# Patient Record
Sex: MALE | Race: WHITE | Employment: UNEMPLOYED | URBAN - METROPOLITAN AREA
[De-identification: names, ages, dates, MRNs, and addresses within clinical notes are randomized per-mention and may not be internally consistent; named-entity substitution may affect disease eponyms.]

---
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
## 2021-06-29 ENCOUNTER — OFFICE VISIT (OUTPATIENT)
Dept: FAMILY MEDICINE CLINIC | Age: 21
End: 2021-06-29
Payer: COMMERCIAL

## 2021-06-29 VITALS
OXYGEN SATURATION: 96 % | HEIGHT: 71 IN | WEIGHT: 138.6 LBS | DIASTOLIC BLOOD PRESSURE: 60 MMHG | SYSTOLIC BLOOD PRESSURE: 110 MMHG | BODY MASS INDEX: 19.4 KG/M2 | HEART RATE: 130 BPM

## 2021-06-29 DIAGNOSIS — J03.90 TONSILLITIS: ICD-10-CM

## 2021-06-29 PROBLEM — F34.1 DYSTHYMIA: Status: RESOLVED | Noted: 2019-03-14 | Resolved: 2021-06-29
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
## 2021-06-29 PROCEDURE — G8420 CALC BMI NORM PARAMETERS: HCPCS | Performed by: FAMILY MEDICINE

## 2021-06-29 PROCEDURE — 99213 OFFICE O/P EST LOW 20 MIN: CPT | Performed by: FAMILY MEDICINE

## 2021-06-29 PROCEDURE — G8427 DOCREV CUR MEDS BY ELIG CLIN: HCPCS | Performed by: FAMILY MEDICINE

## 2021-06-29 PROCEDURE — 1036F TOBACCO NON-USER: CPT | Performed by: FAMILY MEDICINE

## 2021-06-29 RX ORDER — CEPHALEXIN 500 MG/1
500 CAPSULE ORAL 3 TIMES DAILY
Qty: 21 CAPSULE | Refills: 0 | Status: SHIPPED | OUTPATIENT
Start: 2021-06-29 | End: 2021-07-06

## 2021-06-29 SDOH — ECONOMIC STABILITY: FOOD INSECURITY: WITHIN THE PAST 12 MONTHS, THE FOOD YOU BOUGHT JUST DIDN'T LAST AND YOU DIDN'T HAVE MONEY TO GET MORE.: NEVER TRUE

## 2021-06-29 SDOH — ECONOMIC STABILITY: FOOD INSECURITY: WITHIN THE PAST 12 MONTHS, YOU WORRIED THAT YOUR FOOD WOULD RUN OUT BEFORE YOU GOT MONEY TO BUY MORE.: NEVER TRUE

## 2021-06-29 ASSESSMENT — PATIENT HEALTH QUESTIONNAIRE - PHQ9
SUM OF ALL RESPONSES TO PHQ9 QUESTIONS 1 & 2: 0
SUM OF ALL RESPONSES TO PHQ QUESTIONS 1-9: 0
2. FEELING DOWN, DEPRESSED OR HOPELESS: 0
SUM OF ALL RESPONSES TO PHQ QUESTIONS 1-9: 0
SUM OF ALL RESPONSES TO PHQ QUESTIONS 1-9: 0
1. LITTLE INTEREST OR PLEASURE IN DOING THINGS: 0

## 2021-06-29 ASSESSMENT — SOCIAL DETERMINANTS OF HEALTH (SDOH): HOW HARD IS IT FOR YOU TO PAY FOR THE VERY BASICS LIKE FOOD, HOUSING, MEDICAL CARE, AND HEATING?: NOT HARD AT ALL

## 2021-06-29 ASSESSMENT — ENCOUNTER SYMPTOMS
CHANGE IN BOWEL HABIT: 0
VISUAL CHANGE: 0
NAUSEA: 0
SWOLLEN GLANDS: 0
ABDOMINAL PAIN: 0

## 2021-06-29 NOTE — PROGRESS NOTES
Subjective:     Patient ID:Flaco De La Cruz is a 21 y.o. male. Pharyngitis  This is a recurrent problem. The current episode started in the past 7 days. The problem has been waxing and waning. Pertinent negatives include no abdominal pain, arthralgias, change in bowel habit, chest pain, chills, diaphoresis, headaches, joint swelling, myalgias, nausea, neck pain, numbness, swollen glands, urinary symptoms, vertigo, visual change or weakness. Nothing aggravates the symptoms. He has tried nothing for the symptoms. The treatment provided moderate relief. --recurrent--has had tonsil stones  Allergies   Allergen Reactions    Decadron [Dexamethasone]      Intol-hives       Current Outpatient Medications   Medication Sig Dispense Refill    cephALEXin (KEFLEX) 500 MG capsule Take 1 capsule by mouth 3 times daily for 7 days 21 capsule 0     No current facility-administered medications for this visit. Past Medical History:   Diagnosis Date    Varicella 2005       No past surgical history on file. Social History     Socioeconomic History    Marital status: Single     Spouse name: Not on file    Number of children: Not on file    Years of education: Not on file    Highest education level: Not on file   Occupational History    Not on file   Tobacco Use    Smoking status: Never Smoker    Smokeless tobacco: Never Used    Tobacco comment: no second hand smoke exposure   Substance and Sexual Activity    Alcohol use: No    Drug use: No    Sexual activity: Not on file   Other Topics Concern    Not on file   Social History Narrative    Not on file     Social Determinants of Health     Financial Resource Strain: Low Risk     Difficulty of Paying Living Expenses: Not hard at all   Food Insecurity: No Food Insecurity    Worried About [address] in the Last Year: Never true    [address] in the Last Year: Never true   Transportation Needs:     Lack of Transportation (Medical):      Lack of Lymphadenopathy:      Cervical: No cervical adenopathy. Assessment and Plan:      Tonsillitis  Local care and will tx with keflex

## 2021-07-10 ENCOUNTER — NURSE TRIAGE (OUTPATIENT)
Dept: OTHER | Facility: CLINIC | Age: 21
End: 2021-07-10

## 2021-07-10 NOTE — TELEPHONE ENCOUNTER
Reason for Disposition   [1] Sore throat with cough/cold symptoms AND [2] present > 5 days    Answer Assessment - Initial Assessment Questions  1. ONSET: \"When did the throat start hurting? \" (Hours or days ago)   6/28/2021, was seen and given keflex on 6/29/2021. Finished antibiotics, which helped but once finished felt bad again a few days later    2. SEVERITY: \"How bad is the sore throat? \" (Scale 1-10; mild, moderate or severe)    - MILD (1-3):  doesn't interfere with eating or normal activities    - MODERATE (4-7): interferes with eating some solids and normal activities    - SEVERE (8-10):  excruciating pain, interferes with most normal activities    - SEVERE DYSPHAGIA: can't swallow liquids, drooling    7/10    3. STREP EXPOSURE: \"Has there been any exposure to strep within the past week? \" If so, ask: \"What type of contact occurred? \"   No    4. VIRAL SYMPTOMS: Lyndal Fly there any symptoms of a cold, such as a runny nose, cough, hoarse voice or red eyes? \"   Cough-saw a little red in mucous a few times, runny nose    5. FEVER: \"Do you have a fever? \" If so, ask: \"What is your temperature, how was it measured, and when did it start? \"   no, hasn't checked and doesn't feel like he has a fever    6. PUS ON THE TONSILS: \"Is there pus on the tonsils in the back of your throat? \"  Has tonsil stones and slimy stuff on tonsils    7. OTHER SYMPTOMS: \"Do you have any other symptoms? \" (e.g., difficulty breathing, headache, rash)  Headache    8. PREGNANCY: \"Is there any chance you are pregnant? \" \"When was your last menstrual period? \"  n/a    Protocols used: SORE THROAT-ADULT-  Received call from [address] at [facility] with Red Flag Complaint. Brief description of triage: sore throat after finishing antibiotics. Triage indicates for patient to be seen within 3 days.     Care advice provided, patient verbalizes understanding; denies any other questions or concerns; instructed to call back for any new or worsening symptoms. Writer provided warm transfer to [facility] for appointment scheduling. Attention Provider: Thank you for allowing me to participate in the care of your patient. The patient was connected to triage in response to information provided to the [facility]. Please do not respond through this encounter as the response is not directed to a shared pool.

## 2021-07-11 RX ORDER — AMOXICILLIN 875 MG/1
875 TABLET, COATED ORAL 2 TIMES DAILY
Qty: 20 TABLET | Refills: 0 | Status: SHIPPED | OUTPATIENT
Start: 2021-07-11 | End: 2021-07-21

## 2021-07-12 ENCOUNTER — OFFICE VISIT (OUTPATIENT)
Dept: FAMILY MEDICINE CLINIC | Age: 21
End: 2021-07-12
Payer: COMMERCIAL

## 2021-07-12 VITALS
DIASTOLIC BLOOD PRESSURE: 64 MMHG | SYSTOLIC BLOOD PRESSURE: 126 MMHG | TEMPERATURE: 97.7 F | HEART RATE: 90 BPM | OXYGEN SATURATION: 97 %

## 2021-07-12 DIAGNOSIS — Z87.898 HISTORY OF SNORING: ICD-10-CM

## 2021-07-12 DIAGNOSIS — J35.1 CHRONIC TONSILLAR HYPERTROPHY: Primary | ICD-10-CM

## 2021-07-12 DIAGNOSIS — J35.1 CHRONIC TONSILLAR HYPERTROPHY: ICD-10-CM

## 2021-07-12 DIAGNOSIS — J35.8 TONSILLITH: ICD-10-CM

## 2021-07-12 LAB
BASOPHILS ABSOLUTE: 0 K/UL (ref 0–0.2)
BASOPHILS RELATIVE PERCENT: 0.3 %
EOSINOPHILS ABSOLUTE: 0.1 K/UL (ref 0–0.6)
EOSINOPHILS RELATIVE PERCENT: 1.2 %
HCT VFR BLD CALC: 37.7 % (ref 40.5–52.5)
HEMOGLOBIN: 12.8 G/DL (ref 13.5–17.5)
LYMPHOCYTES ABSOLUTE: 2.2 K/UL (ref 1–5.1)
LYMPHOCYTES RELATIVE PERCENT: 17.5 %
MCH RBC QN AUTO: 30.4 PG (ref 26–34)
MCHC RBC AUTO-ENTMCNC: 34 G/DL (ref 31–36)
MCV RBC AUTO: 89.6 FL (ref 80–100)
MONOCYTES ABSOLUTE: 0.9 K/UL (ref 0–1.3)
MONOCYTES RELATIVE PERCENT: 6.9 %
NEUTROPHILS ABSOLUTE: 9.4 K/UL (ref 1.7–7.7)
NEUTROPHILS RELATIVE PERCENT: 74.1 %
PDW BLD-RTO: 13.7 % (ref 12.4–15.4)
PLATELET # BLD: 278 K/UL (ref 135–450)
PMV BLD AUTO: 9.7 FL (ref 5–10.5)
RBC # BLD: 4.2 M/UL (ref 4.2–5.9)
WBC # BLD: 12.7 K/UL (ref 4–11)

## 2021-07-12 PROCEDURE — G8427 DOCREV CUR MEDS BY ELIG CLIN: HCPCS | Performed by: NURSE PRACTITIONER

## 2021-07-12 PROCEDURE — G8420 CALC BMI NORM PARAMETERS: HCPCS | Performed by: NURSE PRACTITIONER

## 2021-07-12 PROCEDURE — 99213 OFFICE O/P EST LOW 20 MIN: CPT | Performed by: NURSE PRACTITIONER

## 2021-07-12 PROCEDURE — 1036F TOBACCO NON-USER: CPT | Performed by: NURSE PRACTITIONER

## 2021-07-12 ASSESSMENT — PATIENT HEALTH QUESTIONNAIRE - PHQ9
SUM OF ALL RESPONSES TO PHQ QUESTIONS 1-9: 0
SUM OF ALL RESPONSES TO PHQ9 QUESTIONS 1 & 2: 0
SUM OF ALL RESPONSES TO PHQ QUESTIONS 1-9: 0
2. FEELING DOWN, DEPRESSED OR HOPELESS: 0
1. LITTLE INTEREST OR PLEASURE IN DOING THINGS: 0
SUM OF ALL RESPONSES TO PHQ QUESTIONS 1-9: 0

## 2021-07-12 NOTE — PROGRESS NOTES
Ludwin Berg (:  2000) is a 21 y.o. male,Established patient, here for evaluation of the following chief complaint(s): Other (sore throat, tonsil stones x 2weeks )      ASSESSMENT/PLAN:  1. Chronic tonsillar hypertrophy  -     Shayy Sevilla DO, Otolaryngology, [place]  -     CBC WITH AUTO DIFFERENTIAL; Future  -     COMPREHENSIVE METABOLIC PANEL; Future  -     MONONUCLEOSIS SCREEN; Future  2. History of snoring  3. Tonsillith  Recurrent tonsillitis. Will rule out mono. We are not strep swabbing in this office. Centor criteria 1.  mother is refusing to have him Covid tested. I did educate her that it is possible the ENT specialist will not be able to provide full care for him until he has a negative Covid test and she is aware of this. No follow-ups on file. SUBJECTIVE/OBJECTIVE:  Angie Greenwood is a 80-year-old who presents with 2 weeks of recurrent sore throat. Reports many years of recurrent tonsil stones, recurrent tonsillitis, recurrent strep. 2 weeks ago was treated with a 10-day course of Keflex for tonsillitis and states he had good relief however when he stopped the antibiotic the pain came back, and the tonsil stones returned. 2 days ago was restarted on amoxicillin reports some improvement. He does pick these tonsil stones out regularly. He presents today with complaints of swollen tonsils. He denies any fever, cough, shortness of breath, fatigue, abdominal pain. Denies difficulty swallowing at this time. His mother reports he does snore. Current Outpatient Medications   Medication Sig Dispense Refill    amoxicillin (AMOXIL) 875 MG tablet Take 1 tablet by mouth 2 times daily for 10 days 20 tablet 0     No current facility-administered medications for this visit. Review of Systems   All other systems reviewed and are negative.       Vitals:    21 1459   BP: 126/64   Pulse: 90   Temp: 97.7 °F (36.5 °C)   SpO2: 97%       Physical Exam  Constitutional: Appearance: He is well-developed. HENT:      Head: Normocephalic. Right Ear: Tympanic membrane normal.      Left Ear: Tympanic membrane normal.      Mouth/Throat:      Mouth: Mucous membranes are moist.      Pharynx: Pharyngeal swelling and posterior oropharyngeal erythema present. Tonsils: No tonsillar exudate or tonsillar abscesses. 3+ on the right. 3+ on the left. Eyes:      Pupils: Pupils are equal, round, and reactive to light. Cardiovascular:      Rate and Rhythm: Normal rate and regular rhythm. Heart sounds: Normal heart sounds. Pulmonary:      Effort: Pulmonary effort is normal.      Breath sounds: Normal breath sounds. Musculoskeletal:         General: Normal range of motion. Cervical back: Normal range of motion. Skin:     General: Skin is warm and dry. Coloration: Skin is pale. Neurological:      Mental Status: He is alert and oriented to person, place, and time. Psychiatric:         Mood and Affect: Mood normal.                 An electronic signature was used to authenticate this note.     --KIERAN Adkins - CNP

## 2021-07-13 LAB
A/G RATIO: 1.3 (ref 1.1–2.2)
ALBUMIN SERPL-MCNC: 4.3 G/DL (ref 3.4–5)
ALP BLD-CCNC: 106 U/L (ref 40–129)
ALT SERPL-CCNC: 20 U/L (ref 10–40)
ANION GAP SERPL CALCULATED.3IONS-SCNC: 12 MMOL/L (ref 3–16)
AST SERPL-CCNC: 20 U/L (ref 15–37)
BILIRUB SERPL-MCNC: 0.3 MG/DL (ref 0–1)
BUN BLDV-MCNC: 8 MG/DL (ref 7–20)
CALCIUM SERPL-MCNC: 9.7 MG/DL (ref 8.3–10.6)
CHLORIDE BLD-SCNC: 101 MMOL/L (ref 99–110)
CO2: 29 MMOL/L (ref 21–32)
CREAT SERPL-MCNC: 0.8 MG/DL (ref 0.9–1.3)
GFR AFRICAN AMERICAN: >60
GFR NON-AFRICAN AMERICAN: >60
GLOBULIN: 3.3 G/DL
GLUCOSE BLD-MCNC: 68 MG/DL (ref 70–99)
MONO TEST: NEGATIVE
POTASSIUM SERPL-SCNC: 4.2 MMOL/L (ref 3.5–5.1)
SODIUM BLD-SCNC: 142 MMOL/L (ref 136–145)
TOTAL PROTEIN: 7.6 G/DL (ref 6.4–8.2)

## 2021-07-20 ENCOUNTER — OFFICE VISIT (OUTPATIENT)
Dept: ENT CLINIC | Age: 21
End: 2021-07-20
Payer: COMMERCIAL

## 2021-07-20 VITALS
SYSTOLIC BLOOD PRESSURE: 122 MMHG | BODY MASS INDEX: 18.48 KG/M2 | HEIGHT: 71 IN | TEMPERATURE: 99 F | WEIGHT: 132 LBS | DIASTOLIC BLOOD PRESSURE: 73 MMHG | HEART RATE: 94 BPM

## 2021-07-20 DIAGNOSIS — J35.01 CHRONIC TONSILLITIS: Primary | ICD-10-CM

## 2021-07-20 DIAGNOSIS — J35.1 TONSILLAR HYPERTROPHY: ICD-10-CM

## 2021-07-20 PROCEDURE — 1036F TOBACCO NON-USER: CPT | Performed by: OTOLARYNGOLOGY

## 2021-07-20 PROCEDURE — G8419 CALC BMI OUT NRM PARAM NOF/U: HCPCS | Performed by: OTOLARYNGOLOGY

## 2021-07-20 PROCEDURE — G8427 DOCREV CUR MEDS BY ELIG CLIN: HCPCS | Performed by: OTOLARYNGOLOGY

## 2021-07-20 PROCEDURE — 99204 OFFICE O/P NEW MOD 45 MIN: CPT | Performed by: OTOLARYNGOLOGY

## 2021-07-20 ASSESSMENT — ENCOUNTER SYMPTOMS
FACIAL SWELLING: 0
VOMITING: 0
BLOOD IN STOOL: 0
COLOR CHANGE: 0
WHEEZING: 0
STRIDOR: 0
COUGH: 0
SINUS PRESSURE: 0
CHOKING: 0
RHINORRHEA: 0
SORE THROAT: 1
VOICE CHANGE: 0
EYE ITCHING: 0
BACK PAIN: 0
SINUS PAIN: 0
CONSTIPATION: 0
DIARRHEA: 0
TROUBLE SWALLOWING: 0
PHOTOPHOBIA: 0
NAUSEA: 0
SHORTNESS OF BREATH: 0
EYE DISCHARGE: 0

## 2021-07-20 NOTE — PROGRESS NOTES
[facility]  [address], [address] [place] [address]  P: [phone]  F: [phone]       Patient     Jesús Wilder  2000    ChiefComplaint     Chief Complaint   Patient presents with    New Patient     Patient is here today because he has had tonsils stones every month this year, he is looking to have his tonsil removed       History of Present Illness     Jesús Wilder is a pleasant 21 y.o. male who presents as a new patient today referred by his primary care physician for chronic tonsillitis and tonsillar hypertrophy. Patient has had significantly enlarged tonsils to his entire life. He has not had any significant episodes with infections up until recently. Over the past 6 months he has had multiple infections requiring antibiotics. Additionally, he has constant production and expression of tonsil stones. He has a constant low-grade sore throat. He is also experiencing bad breath from this. Denies any dysphagia or history of peritonsillar abscess. Denies difficulty breathing. He does snore at night. The patient is interested in tonsillectomy. Past Medical History     Past Medical History:   Diagnosis Date    Varicella 2005       Past Surgical History     No past surgical history on file. Family History     No family history on file.     Social History     Social History     Socioeconomic History    Marital status: Single     Spouse name: Not on file    Number of children: Not on file    Years of education: Not on file    Highest education level: Not on file   Occupational History    Not on file   Tobacco Use    Smoking status: Never Smoker    Smokeless tobacco: Never Used    Tobacco comment: no second hand smoke exposure   Substance and Sexual Activity    Alcohol use: No    Drug use: No    Sexual activity: Not on file   Other Topics Concern    Not on file   Social History Narrative    Not on file     Social Determinants of Health     Financial Resource Strain: Low Risk     Difficulty of Paying Living Expenses: Not hard at all   Food Insecurity: No Food Insecurity    Worried About Running Out of Food in the Last Year: Never true    Doug of Food in the Last Year: Never true   Transportation Needs:     Lack of Transportation (Medical):  Lack of Transportation (Non-Medical):    Physical Activity:     Days of Exercise per Week:     Minutes of Exercise per Session:    Stress:     Feeling of Stress :    Social Connections:     Frequency of Communication with Friends and Family:     Frequency of Social Gatherings with Friends and Family:     Attends Muslim Services:     Active Member of Clubs or Organizations:     Attends Club or Organization Meetings:     Marital Status:    Intimate Partner Violence:     Fear of Current or Ex-Partner:     Emotionally Abused:     Physically Abused:     Sexually Abused: Allergies     Allergies   Allergen Reactions    Decadron [Dexamethasone]      Intol-hives       Medications     Current Outpatient Medications   Medication Sig Dispense Refill    amoxicillin (AMOXIL) 875 MG tablet Take 1 tablet by mouth 2 times daily for 10 days 20 tablet 0     No current facility-administered medications for this visit. Review of Systems     Review of Systems   Constitutional: Negative for activity change, appetite change, chills, diaphoresis, fatigue, fever and unexpected weight change. HENT: Positive for sore throat. Negative for congestion, dental problem, drooling, ear discharge, ear pain, facial swelling, hearing loss, mouth sores, nosebleeds, postnasal drip, rhinorrhea, sinus pressure, sinus pain, sneezing, tinnitus, trouble swallowing and voice change. Eyes: Negative for photophobia, discharge, itching and visual disturbance. Respiratory: Negative for cough, choking, shortness of breath, wheezing and stridor.     Gastrointestinal: Negative for blood in stool, constipation, diarrhea, nausea and vomiting. Endocrine: Negative for cold intolerance, heat intolerance, polyphagia and polyuria. Musculoskeletal: Negative for back pain, gait problem, neck pain and neck stiffness. Skin: Negative for color change, pallor, rash and wound. Neurological: Negative for dizziness, syncope, facial asymmetry, speech difficulty, light-headedness, numbness and headaches. Hematological: Negative for adenopathy. Does not bruise/bleed easily. Psychiatric/Behavioral: Negative for agitation, confusion and sleep disturbance. PhysicalExam     Vitals:    07/20/21 1639   BP: 122/73   Pulse: 94   Temp: 99 °F (37.2 °C)       Physical Exam  Constitutional:       Appearance: He is well-developed. HENT:      Head: Normocephalic and atraumatic. Not macrocephalic and not microcephalic. No raccoon eyes, Olivares's sign, abrasion, contusion, right periorbital erythema, left periorbital erythema or laceration. Hair is normal.      Jaw: No trismus. Right Ear: Hearing, tympanic membrane and external ear normal. No decreased hearing noted. No drainage, swelling or tenderness. No middle ear effusion. No mastoid tenderness. Tympanic membrane is not perforated, retracted or bulging. Tympanic membrane has normal mobility. Left Ear: Hearing, tympanic membrane and external ear normal. No decreased hearing noted. No drainage, swelling or tenderness. No middle ear effusion. No mastoid tenderness. Tympanic membrane is not perforated, retracted or bulging. Tympanic membrane has normal mobility. Nose: No nasal deformity, septal deviation, laceration, mucosal edema or rhinorrhea. Right Sinus: No maxillary sinus tenderness or frontal sinus tenderness. Left Sinus: No maxillary sinus tenderness or frontal sinus tenderness. Mouth/Throat:      Mouth: Mucous membranes are not pale, not dry and not cyanotic. No lacerations or oral lesions. Dentition: Normal dentition. No dental caries or dental abscesses. Pharynx: Uvula midline. No oropharyngeal exudate, posterior oropharyngeal erythema or uvula swelling. Tonsils: No tonsillar abscesses. 3+ on the right. 3+ on the left. Eyes:      General: Lids are normal.         Right eye: No discharge. Left eye: No discharge. Extraocular Movements:      Right eye: Normal extraocular motion and no nystagmus. Left eye: Normal extraocular motion and no nystagmus. Conjunctiva/sclera:      Right eye: No chemosis or exudate. Left eye: No chemosis or exudate. Neck:      Thyroid: No thyroid mass or thyromegaly. Vascular: Normal carotid pulses. Trachea: No tracheal tenderness or tracheal deviation. Cardiovascular:      Rate and Rhythm: Normal rate and regular rhythm. Pulmonary:      Effort: No tachypnea, bradypnea or respiratory distress. Breath sounds: No stridor. Musculoskeletal:      Right shoulder: Normal range of motion. Cervical back: Neck supple. Lymphadenopathy:      Head:      Right side of head: No submental, submandibular, tonsillar, preauricular, posterior auricular or occipital adenopathy. Left side of head: No submental, submandibular, tonsillar, preauricular, posterior auricular or occipital adenopathy. Cervical: No cervical adenopathy. Right cervical: No superficial, deep or posterior cervical adenopathy. Left cervical: No superficial, deep or posterior cervical adenopathy. Skin:     General: Skin is warm and dry. Findings: No bruising, erythema, laceration, lesion or rash. Neurological:      Mental Status: He is alert and oriented to person, place, and time. Cranial Nerves: No cranial nerve deficit. Psychiatric:         Speech: Speech normal.         Behavior: Behavior normal.           Procedure           Assessment and Plan     1. Chronic tonsillitis  Patient has chronic tonsillitis and tonsillar hypertrophy. Recommend tonsillectomy.   Risks, benefits and alternatives of the procedure discussed with the patient. Risk include, but not limited to bleeding, infection, pain, dehydration, risks of anesthesia, death. Patient understands risks and would like to proceed with the procedure. 2. Tonsillar hypertrophy        Return for 3 week post op. Portions of this note were dictated using Dragon.  There may be linguistic errors secondary to the use of this program.

## 2021-07-26 ENCOUNTER — TELEPHONE (OUTPATIENT)
Dept: FAMILY MEDICINE CLINIC | Age: 21
End: 2021-07-26

## 2021-07-26 RX ORDER — AMOXICILLIN 875 MG/1
875 TABLET, COATED ORAL 2 TIMES DAILY
Qty: 28 TABLET | Refills: 0 | Status: SHIPPED | OUTPATIENT
Start: 2021-07-26 | End: 2021-08-12

## 2021-07-26 NOTE — TELEPHONE ENCOUNTER
Pt has been on 2 rounds of antibiotics. The minute he is done with the antibiotics the symptoms come right back. Fever and cannot move head. Hard to swallow. Has a hard time swallowing food. Is taking ibuprofen which does seem to help but surgery is at least 1 month out. Mom would like to speak to Dr. Lee Askew about if there is anything that can be done while they wait for the surgery. Pt is in absolute misery. Please call mom to discuss.

## 2021-07-29 DIAGNOSIS — J35.01 CHRONIC TONSILLITIS: Primary | ICD-10-CM

## 2021-08-11 NOTE — PROGRESS NOTES
If no one is available at the desk, proceed into the [facility] Waiting Room and go through the door directly into the [facility]. There is a Check-in desk ACROSS from Room 5 (marked with a sign hanging from the ceiling). The phone number for the surgery center is [phone]. 4. Please call [phone] option #2 option #2 if you have not been preregistered yet. On the day of your procedure bring your insurance card and photo ID. You will be registered at your bedside once brought back to your room. 5. DO NOT EAT ANYTHING eight hours prior to your arrival for surgery. May have 8 ounces of water 4 hours prior to your arrival for surgery. NOTE: ALL Gastric, Bariatric and Bowel surgery patients MUST follow their surgeon's instructions. 6. MEDICATIONS    Take the following medications with a SMALL sip of water: none   Bariatric patient's call surgeon if on diabetic medications as some need to be stopped 1 week preop   Use your usual dose of inhalers the morning of surgery. BRING your rescue inhaler with you to hospital.    Anesthesia does NOT want you to take insulin the morning of surgery. They will control your blood sugar while you are at the hospital. Please contact your ordering physician for instructions regarding your insulin the night before your procedure. If you have an insulin pump, please keep it set on basal rate. 7. Do not swallow water when brushing teeth. No gum, candy, mints or ice chips. Refrain from smoking or at least decrease the amount. 8. Dress in loose, comfortable clothing appropriate for redressing after your procedure. Do not wear jewelry (including body piercings), make-up (especially NO eye make-up), fingernail polish (NO toenail polish if foot/leg surgery), lotion, powders or metal hairclips. 9. Dentures, glasses, or contacts will need to be removed before your procedure.  Bring cases for your glasses, contacts, dentures, or hearing aids to protect them while you are in surgery. 10. If you use a CPAP, please bring it with you on the day of your procedure. 11. We recommend that valuable personal  belongings such as cash, cell phones, e-tablets or jewelry, be left at home during your stay. The hospital will not be responsible for valuables that are not secured in the hospital safe. However, if your insurance requires a co-pay, you may want to bring a method of payment, i.e. Check or credit card, if you wish to pay your co-pay the day of surgery. 12. If you are to stay overnight, you may bring a bag with personal items. Please have any large items you may need brought in by your family after your arrival to your hospital room. 15. If you have a Living Will or Durable Power of , please bring a copy on the day of your procedure. 15. With your permission, one family member may accompany you while you are being prepared for surgery. Once you are ready, additional family members may join you. HOW WE KEEP YOU SAFE and WORK TO PREVENT SURGICAL SITE INFECTIONS:  1. Health care workers should always check your ID bracelet to verify your name and birth date. You will be asked many times to state your name, date of birth, and allergies. 2. Health care workers should always clean their hands with soap or alcohol gel before providing care to you. It is okay to ask anyone if they cleaned their hands before they touch you. 3. You will be actively involved in verifying the type of procedure you are having and ensuring the correct surgical site. This will be confirmed multiple times prior to your procedure. Do NOT goldie your surgery site UNLESS instructed to by your surgeon. 4. Do not shave or wax for 72 hours prior to procedure near your operative site. Shaving with a razor can irritate your skin and make it easier to develop an infection.  On the day of your procedure, any hair that needs to be removed near the surgical site will be clipped by a healthcare worker using a special clippers designed to avoid skin irritation. 5. When you are in the operating room, your surgical site will be cleansed with a special soap, and in most cases, you will be given an antibiotic before the surgery begins. What to expect AFTER YOUR PROCEDURE:  1. Immediately following your procedure, your will be taken to the PACU for the first phase of your recovery. Your nurse will help you recover from any potential side effects of anesthesia, such as extreme drowsiness, changes in your vital signs or breathing patterns. Nausea, headache, muscle aches, or sore throat may also occur after anesthesia. Your nurse will help you manage these potential side effects. 2. For comfort and safety, arrange to have someone at home with you for the first 24 hours after discharge. 3. You and your family will be given written instructions about your diet, activity, dressing care, medications, and return visits. 4. Once at home, should issues with nausea, pain, or bleeding occur, or should you notice any signs of infection, you should call your surgeon. 5. Always clean your hands before and after caring for your wound. Do not let your family touch your surgery site without cleaning their hands. 6. Narcotic pain medications can cause significant constipation. You may want to add a stool softener to your postoperative medication schedule or speak to your surgeon on how best to manage this SIDE EFFECT. SPECIAL INSTRUCTIONS patient to clarify arrival time with surgeon, patient acknowledges understanding of this along with pre op instructions. Thank you for allowing us to care for you. We strive to exceed your expectations in the delivery of care and service provided to you and your family.      If you need to contact the [facility]  staff for any reason, please call us at [phone]    Instructions reviewed with patient during preadmission testing phone interview.   Hayde Meyers RN.8/11/2021 .10:47 AM      ADDITIONAL EDUCATIONAL INFORMATION REVIEWED PER PHONE WITH YOU AND/OR YOUR FAMILY:  No Hibiclens® Bathing Instructions   Yes Antibacterial Soap

## 2021-08-11 NOTE — PROGRESS NOTES
Name:   :   MR#:   Place patient label inside box (if no patient label, complete above)  PROCEDURAL INFORMED [address] / PROCEDURE  1. I (we), ALPHONSE LEO  (Patient Name) authorize DR. Elisabet Kwon  (Provider / [facility]) and/or such assistants as may be selected by him/her, to perform the following operation/procedure(s):  TONSILLECTOMY    Note: If unable to obtain consent prior to an emergent procedure, document the emergent reason in the medical record. This procedure has been explained to my (our) satisfaction and included in the explanation was:   A) The intended benefit, nature, and extent of the procedure to be performed;  B) The significant risks involved and the probability of success;  C) Alternative procedures and methods of treatment;  D) The dangers and probable consequences of such alternatives (including no procedure or treatment); E) The expected consequences of the procedure on my future health;  F) Whether other qualified individuals would be performing important surgical tasks and/or whether  would be present to advise or support the procedure. I (we) understand that there are other risks of infection and other serious complications in the pre-operative/procedural and postoperative/procedural stages of my (our) care. I (we) have asked all of the questions which I (we) thought were important in deciding whether or not to undergo treatment or diagnosis. These questions have been answered to my (our) satisfaction. I (we) understand that no assurance can be given that the procedure will be a success, and no guarantee or warranty of success has been given to me (us). 2. It has been explained to me (us) that during the course of the operation/procedure, unforeseen conditions may be revealed that necessitate extension of the original procedure(s) or different procedure(s) than those set forth in Paragraph 1.  I (we) authorize and request that the above-named physician, his/her assistants or his/her designees, perform procedures as necessary and desirable if deemed to be in my (our) best interest.     3. I acknowledge that health care personnel may be observing this procedure for the purpose of medical education or other specified purposes as may be necessary as requested and/or approved by my (our) physician. 4. I (we) consent to the disposal by the hospital Pathologist of the removed tissue, parts or organs in accordance with hospital policy. 5. I do ____ do not ____ consent to the use of a local infiltration pain blocking agent that will be used by my provider/surgical provider to help alleviate pain during my procedure. 6. I do ____ do not ____ consent to an emergent blood transfusion in the case of a life-threatening situation that requires blood components to be administered. This consent is valid for 24 hours from the beginning of the procedure. 7. This patient does ____ or does not ____ currently have a DNR status/order. If DNR order is in place, obtain Addendum to the Surgical Consent for ALL Patients with a DNR Order to address candelario-operative status for limited intervention or DNR suspension.      8. I have read and fully understand the above Consent for Operation/Procedure and that all blanks were completed before I signed the consent.     ____________________        ______________________           ______/_____am/pm  Signature of Patient or legal representative               Printed Name / Relationship                      Date / Time      ____________________          _____________________          ______/______am/pm  Witness to Signature                             Printed Name                      Date / Time     (If patient is unable to sign or is a minor, complete the following)  Patient is a minor, ____ years of age, or unable to sign because: _______________________________________________________________    Jose Alejandro chakraborty

## 2021-08-12 ENCOUNTER — OFFICE VISIT (OUTPATIENT)
Dept: FAMILY MEDICINE CLINIC | Age: 21
End: 2021-08-12
Payer: COMMERCIAL

## 2021-08-12 VITALS
DIASTOLIC BLOOD PRESSURE: 60 MMHG | BODY MASS INDEX: 17.92 KG/M2 | HEIGHT: 73 IN | WEIGHT: 135.2 LBS | HEART RATE: 104 BPM | SYSTOLIC BLOOD PRESSURE: 100 MMHG | OXYGEN SATURATION: 97 %

## 2021-08-12 DIAGNOSIS — Z01.818 PRE-OP EXAM: ICD-10-CM

## 2021-08-12 DIAGNOSIS — J03.90 TONSILLITIS: ICD-10-CM

## 2021-08-12 PROCEDURE — G8419 CALC BMI OUT NRM PARAM NOF/U: HCPCS | Performed by: FAMILY MEDICINE

## 2021-08-12 PROCEDURE — G8427 DOCREV CUR MEDS BY ELIG CLIN: HCPCS | Performed by: FAMILY MEDICINE

## 2021-08-12 PROCEDURE — 99242 OFF/OP CONSLTJ NEW/EST SF 20: CPT | Performed by: FAMILY MEDICINE

## 2021-08-12 ASSESSMENT — PATIENT HEALTH QUESTIONNAIRE - PHQ9
2. FEELING DOWN, DEPRESSED OR HOPELESS: 0
SUM OF ALL RESPONSES TO PHQ9 QUESTIONS 1 & 2: 0
SUM OF ALL RESPONSES TO PHQ QUESTIONS 1-9: 0
1. LITTLE INTEREST OR PLEASURE IN DOING THINGS: 0
SUM OF ALL RESPONSES TO PHQ QUESTIONS 1-9: 0
SUM OF ALL RESPONSES TO PHQ QUESTIONS 1-9: 0

## 2021-08-12 NOTE — PROGRESS NOTES
Subjective:      Lillie Valentino is a 21 y.o. male who presents to the office today for a preoperative consultation at the request of surgeon Dr Rogerio Karimi who plans on performing T/U(recurrent tonsillitis (?crypic tonsils)) on August 16. This consultation is requested for the specific conditions prompting preoperative evaluation (i.e. because of potential affect on operative risk): recurrent episodes of infections. Planned anesthesia is General.  The patient and family have no known anesthesia issues nor bleeding risks. Past Medical History:   Diagnosis Date    Varicella 2005     Patient Active Problem List    Diagnosis Date Noted    Pre-op exam 08/12/2021    Primary insomnia 11/02/2018    Attention deficit hyperactivity disorder (ADHD), combined type 05/18/2018    Warts 09/01/2016    Tonsillitis 10/12/2011     Past Surgical History:   Procedure Laterality Date    TOOTH EXTRACTION      age 1 years    [employer] WISDOM TOOTH EXTRACTION       No family history on file. Social History     Socioeconomic History    Marital status: Single     Spouse name: None    Number of children: None    Years of education: None    Highest education level: None   Occupational History    None   Tobacco Use    Smoking status: Never Smoker    Smokeless tobacco: Never Used    Tobacco comment: no second hand smoke exposure   Vaping Use    Vaping Use: Never used   Substance and Sexual Activity    Alcohol use: No    Drug use: No    Sexual activity: None   Other Topics Concern    None   Social History Narrative    None     Social Determinants of Health     Financial Resource Strain: Low Risk     Difficulty of Paying Living Expenses: Not hard at all   Food Insecurity: No Food Insecurity    Worried About Running Out of Food in the Last Year: Never true    Doug of Food in the Last Year: Never true   Transportation Needs:     Lack of Transportation (Medical):      Lack of Transportation (Non-Medical):    Physical Activity:  Days of Exercise per Week:     Minutes of Exercise per Session:    Stress:     Feeling of Stress :    Social Connections:     Frequency of Communication with Friends and Family:     Frequency of Social Gatherings with Friends and Family:     Attends Buddhism Services:     Active Member of Clubs or Organizations:     Attends Club or Organization Meetings:     Marital Status:    Intimate Partner Violence:     Fear of Current or Ex-Partner:     Emotionally Abused:     Physically Abused:     Sexually Abused:      Current Outpatient Medications   Medication Sig Dispense Refill    amoxicillin (AMOXIL) 875 MG tablet Take 1 tablet by mouth 2 times daily for 14 days 28 tablet 0     No current facility-administered medications for this visit. Current Outpatient Medications on File Prior to Visit   Medication Sig Dispense Refill    amoxicillin (AMOXIL) 875 MG tablet Take 1 tablet by mouth 2 times daily for 14 days 28 tablet 0     No current facility-administered medications on file prior to visit.      Allergies   Allergen Reactions    Decadron [Dexamethasone] Hives     Intol-hives     Review of Systems  Constitutional: negative  Eyes: negative  Ears, nose, mouth, throat, and face: positive for sore throat  Respiratory: negative  Cardiovascular: negative  Gastrointestinal: negative  Genitourinary:negative  Integument/breast: negative  Hematologic/lymphatic: negative  Musculoskeletal:negative  Neurological: negative  Behavioral/Psych: negative  Endocrine: negative  Allergic/Immunologic: negative      Objective:      /60   Pulse 104   Ht 6' 1\" (1.854 m)   Wt 135 lb 3.2 oz (61.3 kg)   SpO2 97%   BMI 17.84 kg/m²     General Appearance:  Alert, cooperative, no distress, appears stated age   Head:  Normocephalic, without obvious abnormality, atraumatic   Eyes:  PERRL, conjunctiva/corneas clear, EOM's intact, fundi benign, both eyes   Ears:  Normal TM's and external ear canals, both ears   Nose: Nares normal, septum midline, mucosa normal, no drainage or sinus tenderness   Throat: Lips, mucosa, and tongue normal; teeth and gums normal-large tonsils   Neck: Supple, symmetrical, trachea midline, no adenopathy, thyroid: not enlarged, symmetric, no tenderness/mass/nodules, no carotid bruit or JVD   Back:   Symmetric, no curvature, ROM normal, no CVA tenderness   Lungs:   Clear to auscultation bilaterally, respirations unlabored   Chest Wall:  No tenderness or deformity   Heart:  Regular rate and rhythm, S1, S2 normal, no murmur, rub or gallop   Abdomen:   Soft, non-tender, bowel sounds active all four quadrants,  no masses, no organomegaly   Genitalia:  Normal male   Rectal:  Normal tone, normal prostate, no masses or tenderness;  guaiac negative stool   Extremities: Extremities normal, atraumatic, no cyanosis or edema   Pulses: 2+ and symmetric   Skin: Skin color, texture, turgor normal, no rashes or lesions   Lymph nodes: Cervical, supraclavicular, and axillary nodes normal   Neurologic: Normal         Predictors of intubation difficulty:   Morbid obesity? no   Anatomically abnormal facies? no   Prominent incisors? no   Receding mandible? no   Short, thick neck? no   Neck range of motion: normal   Mallampati score: I (soft palate, uvula, fauces, tonsillar pillars visible)   Thyromental distance: < 6cm   Mouth openincm   Dentition: No chipped, loose, or missing teeth.       Lab Review   Orders Only on 2021   Component Date Value    SARS-CoV-2 2021 Not Detected    Orders Only on 2021   Component Date Value    Mono Test 2021 Negative     Sodium 2021 142     Potassium 2021 4.2     Chloride 2021 101     CO2 2021 29     Anion Gap 2021 12     Glucose 2021 68*    BUN 2021 8     CREATININE 2021 0.8*    GFR Non- 2021 >60     GFR  2021 >60     Calcium 2021 9.7     Total Protein 07/12/2021 7.6     Albumin 07/12/2021 4.3     Albumin/Globulin Ratio 07/12/2021 1.3     Total Bilirubin 07/12/2021 0.3     Alkaline Phosphatase 07/12/2021 106     ALT 07/12/2021 20     AST 07/12/2021 20     Globulin 07/12/2021 3.3     WBC 07/12/2021 12.7*    RBC 07/12/2021 4.20     Hemoglobin 07/12/2021 12.8*    Hematocrit 07/12/2021 37.7*    MCV 07/12/2021 89.6     MCH 07/12/2021 30.4     MCHC 07/12/2021 34.0     RDW 07/12/2021 13.7     Platelets 72/15/1846 278     MPV 07/12/2021 9.7     Neutrophils % 07/12/2021 74.1     Lymphocytes % 07/12/2021 17.5     Monocytes % 07/12/2021 6.9     Eosinophils % 07/12/2021 1.2     Basophils % 07/12/2021 0.3     Neutrophils Absolute 07/12/2021 9.4*    Lymphocytes Absolute 07/12/2021 2.2     Monocytes Absolute 07/12/2021 0.9     Eosinophils Absolute 07/12/2021 0.1     Basophils Absolute 07/12/2021 0.0          Assessment:        21 y.o. male with planned surgery as above--OKd for planned procedure  Known risk factors for perioperative complications: None    Difficulty with intubation is not anticipated. Cardiac Risk Estimation: low risk    Current medications which may produce withdrawal symptoms if withheld perioperatively: none       Plan:      1. Preoperative workup as follows none  2. Change in medication regimen before surgery: discontinue NSAIDs (OTC) 14d before surgery  3. Prophylaxis for cardiac events with perioperative beta-blockers: not indicated  4.  Invasive hemodynamic monitoring perioperatively: not indicated

## 2021-08-13 ENCOUNTER — ANESTHESIA EVENT (OUTPATIENT)
Dept: OPERATING ROOM | Age: 21
End: 2021-08-13
Payer: COMMERCIAL

## 2021-08-13 ENCOUNTER — TELEPHONE (OUTPATIENT)
Dept: ENT CLINIC | Age: 21
End: 2021-08-13

## 2021-08-16 ENCOUNTER — HOSPITAL ENCOUNTER (OUTPATIENT)
Age: 21
Setting detail: OUTPATIENT SURGERY
Discharge: HOME | End: 2021-08-16
Attending: OTOLARYNGOLOGY | Admitting: OTOLARYNGOLOGY
Payer: COMMERCIAL

## 2021-08-16 ENCOUNTER — ANESTHESIA (OUTPATIENT)
Dept: OPERATING ROOM | Age: 21
End: 2021-08-16
Payer: COMMERCIAL

## 2021-08-16 VITALS
HEART RATE: 63 BPM | SYSTOLIC BLOOD PRESSURE: 138 MMHG | WEIGHT: 136 LBS | DIASTOLIC BLOOD PRESSURE: 74 MMHG | TEMPERATURE: 97.1 F | HEIGHT: 73 IN | RESPIRATION RATE: 15 BRPM | BODY MASS INDEX: 18.02 KG/M2 | OXYGEN SATURATION: 97 %

## 2021-08-16 VITALS — OXYGEN SATURATION: 100 % | SYSTOLIC BLOOD PRESSURE: 102 MMHG | DIASTOLIC BLOOD PRESSURE: 51 MMHG

## 2021-08-16 DIAGNOSIS — G89.18 ACUTE POST-OPERATIVE PAIN: Primary | ICD-10-CM

## 2021-08-16 DIAGNOSIS — J35.1 TONSILLAR HYPERTROPHY: ICD-10-CM

## 2021-08-16 DIAGNOSIS — J35.01 CHRONIC TONSILLITIS: ICD-10-CM

## 2021-08-16 PROCEDURE — 2580000003 HC RX 258: Performed by: ANESTHESIOLOGY

## 2021-08-16 PROCEDURE — 6360000002 HC RX W HCPCS: Performed by: ANESTHESIOLOGY

## 2021-08-16 PROCEDURE — 7100000010 HC PHASE II RECOVERY - FIRST 15 MIN: Performed by: OTOLARYNGOLOGY

## 2021-08-16 PROCEDURE — 2709999900 HC NON-CHARGEABLE SUPPLY: Performed by: OTOLARYNGOLOGY

## 2021-08-16 PROCEDURE — 88304 TISSUE EXAM BY PATHOLOGIST: CPT

## 2021-08-16 PROCEDURE — 3600000004 HC SURGERY LEVEL 4 BASE: Performed by: OTOLARYNGOLOGY

## 2021-08-16 PROCEDURE — 6360000002 HC RX W HCPCS: Performed by: NURSE ANESTHETIST, CERTIFIED REGISTERED

## 2021-08-16 PROCEDURE — 7100000000 HC PACU RECOVERY - FIRST 15 MIN: Performed by: OTOLARYNGOLOGY

## 2021-08-16 PROCEDURE — 3700000001 HC ADD 15 MINUTES (ANESTHESIA): Performed by: OTOLARYNGOLOGY

## 2021-08-16 PROCEDURE — 42826 REMOVAL OF TONSILS: CPT | Performed by: OTOLARYNGOLOGY

## 2021-08-16 PROCEDURE — 2500000003 HC RX 250 WO HCPCS: Performed by: NURSE ANESTHETIST, CERTIFIED REGISTERED

## 2021-08-16 PROCEDURE — 3700000000 HC ANESTHESIA ATTENDED CARE: Performed by: OTOLARYNGOLOGY

## 2021-08-16 PROCEDURE — 7100000001 HC PACU RECOVERY - ADDTL 15 MIN: Performed by: OTOLARYNGOLOGY

## 2021-08-16 PROCEDURE — 3600000014 HC SURGERY LEVEL 4 ADDTL 15MIN: Performed by: OTOLARYNGOLOGY

## 2021-08-16 PROCEDURE — 7100000011 HC PHASE II RECOVERY - ADDTL 15 MIN: Performed by: OTOLARYNGOLOGY

## 2021-08-16 RX ORDER — HYDRALAZINE HYDROCHLORIDE 20 MG/ML
5 INJECTION INTRAMUSCULAR; INTRAVENOUS EVERY 5 MIN PRN
Status: DISCONTINUED | OUTPATIENT
Start: 2021-08-16 | End: 2021-08-16 | Stop reason: HOSPADM

## 2021-08-16 RX ORDER — PROPOFOL 10 MG/ML
INJECTION, EMULSION INTRAVENOUS PRN
Status: DISCONTINUED | OUTPATIENT
Start: 2021-08-16 | End: 2021-08-16 | Stop reason: SDUPTHER

## 2021-08-16 RX ORDER — ACETAMINOPHEN 500 MG
500 TABLET ORAL EVERY 6 HOURS PRN
Qty: 60 TABLET | Refills: 0 | Status: SHIPPED | OUTPATIENT
Start: 2021-08-16 | End: 2021-09-16

## 2021-08-16 RX ORDER — LIDOCAINE HCL/PF 100 MG/5ML
SYRINGE (ML) INJECTION PRN
Status: DISCONTINUED | OUTPATIENT
Start: 2021-08-16 | End: 2021-08-16 | Stop reason: SDUPTHER

## 2021-08-16 RX ORDER — OXYCODONE HYDROCHLORIDE 5 MG/1
5 TABLET ORAL EVERY 6 HOURS PRN
Qty: 30 TABLET | Refills: 0 | Status: SHIPPED | OUTPATIENT
Start: 2021-08-16 | End: 2021-08-23

## 2021-08-16 RX ORDER — ONDANSETRON 2 MG/ML
4 INJECTION INTRAMUSCULAR; INTRAVENOUS
Status: DISCONTINUED | OUTPATIENT
Start: 2021-08-16 | End: 2021-08-16 | Stop reason: HOSPADM

## 2021-08-16 RX ORDER — LIDOCAINE HYDROCHLORIDE 10 MG/ML
1 INJECTION, SOLUTION EPIDURAL; INFILTRATION; INTRACAUDAL; PERINEURAL
Status: DISCONTINUED | OUTPATIENT
Start: 2021-08-16 | End: 2021-08-16 | Stop reason: HOSPADM

## 2021-08-16 RX ORDER — OXYCODONE HYDROCHLORIDE 5 MG/1
5 TABLET ORAL
Status: DISCONTINUED | OUTPATIENT
Start: 2021-08-16 | End: 2021-08-16 | Stop reason: HOSPADM

## 2021-08-16 RX ORDER — ONDANSETRON 2 MG/ML
INJECTION INTRAMUSCULAR; INTRAVENOUS PRN
Status: DISCONTINUED | OUTPATIENT
Start: 2021-08-16 | End: 2021-08-16 | Stop reason: SDUPTHER

## 2021-08-16 RX ORDER — IBUPROFEN 600 MG/1
600 TABLET ORAL EVERY 6 HOURS PRN
Qty: 60 TABLET | Refills: 0 | Status: SHIPPED | OUTPATIENT
Start: 2021-08-16 | End: 2021-09-16

## 2021-08-16 RX ORDER — MIDAZOLAM HYDROCHLORIDE 1 MG/ML
INJECTION INTRAMUSCULAR; INTRAVENOUS PRN
Status: DISCONTINUED | OUTPATIENT
Start: 2021-08-16 | End: 2021-08-16 | Stop reason: SDUPTHER

## 2021-08-16 RX ORDER — SODIUM CHLORIDE 9 MG/ML
25 INJECTION, SOLUTION INTRAVENOUS PRN
Status: DISCONTINUED | OUTPATIENT
Start: 2021-08-16 | End: 2021-08-16 | Stop reason: HOSPADM

## 2021-08-16 RX ORDER — ROCURONIUM BROMIDE 10 MG/ML
INJECTION, SOLUTION INTRAVENOUS PRN
Status: DISCONTINUED | OUTPATIENT
Start: 2021-08-16 | End: 2021-08-16 | Stop reason: SDUPTHER

## 2021-08-16 RX ORDER — SODIUM CHLORIDE, SODIUM LACTATE, POTASSIUM CHLORIDE, CALCIUM CHLORIDE 600; 310; 30; 20 MG/100ML; MG/100ML; MG/100ML; MG/100ML
INJECTION, SOLUTION INTRAVENOUS CONTINUOUS
Status: DISCONTINUED | OUTPATIENT
Start: 2021-08-16 | End: 2021-08-16 | Stop reason: HOSPADM

## 2021-08-16 RX ORDER — LABETALOL HYDROCHLORIDE 5 MG/ML
5 INJECTION, SOLUTION INTRAVENOUS EVERY 10 MIN PRN
Status: DISCONTINUED | OUTPATIENT
Start: 2021-08-16 | End: 2021-08-16 | Stop reason: HOSPADM

## 2021-08-16 RX ORDER — SODIUM CHLORIDE 0.9 % (FLUSH) 0.9 %
10 SYRINGE (ML) INJECTION EVERY 12 HOURS SCHEDULED
Status: DISCONTINUED | OUTPATIENT
Start: 2021-08-16 | End: 2021-08-16 | Stop reason: HOSPADM

## 2021-08-16 RX ORDER — SODIUM CHLORIDE 0.9 % (FLUSH) 0.9 %
10 SYRINGE (ML) INJECTION PRN
Status: DISCONTINUED | OUTPATIENT
Start: 2021-08-16 | End: 2021-08-16 | Stop reason: HOSPADM

## 2021-08-16 RX ORDER — FENTANYL CITRATE 50 UG/ML
50 INJECTION, SOLUTION INTRAMUSCULAR; INTRAVENOUS EVERY 5 MIN PRN
Status: DISCONTINUED | OUTPATIENT
Start: 2021-08-16 | End: 2021-08-16 | Stop reason: HOSPADM

## 2021-08-16 RX ORDER — FENTANYL CITRATE 50 UG/ML
25 INJECTION, SOLUTION INTRAMUSCULAR; INTRAVENOUS EVERY 5 MIN PRN
Status: DISCONTINUED | OUTPATIENT
Start: 2021-08-16 | End: 2021-08-16 | Stop reason: HOSPADM

## 2021-08-16 RX ORDER — ENALAPRILAT 2.5 MG/2ML
1.25 INJECTION INTRAVENOUS
Status: DISCONTINUED | OUTPATIENT
Start: 2021-08-16 | End: 2021-08-16 | Stop reason: HOSPADM

## 2021-08-16 RX ORDER — FENTANYL CITRATE 50 UG/ML
INJECTION, SOLUTION INTRAMUSCULAR; INTRAVENOUS PRN
Status: DISCONTINUED | OUTPATIENT
Start: 2021-08-16 | End: 2021-08-16 | Stop reason: SDUPTHER

## 2021-08-16 RX ADMIN — PROPOFOL 200 MG: 10 INJECTION, EMULSION INTRAVENOUS at 09:33

## 2021-08-16 RX ADMIN — MIDAZOLAM HYDROCHLORIDE 2 MG: 2 INJECTION, SOLUTION INTRAMUSCULAR; INTRAVENOUS at 09:27

## 2021-08-16 RX ADMIN — FENTANYL CITRATE 50 MCG: 50 INJECTION, SOLUTION INTRAMUSCULAR; INTRAVENOUS at 09:52

## 2021-08-16 RX ADMIN — PROPOFOL 50 MG: 10 INJECTION, EMULSION INTRAVENOUS at 09:34

## 2021-08-16 RX ADMIN — SUGAMMADEX 200 MG: 100 INJECTION, SOLUTION INTRAVENOUS at 10:00

## 2021-08-16 RX ADMIN — ROCURONIUM BROMIDE 40 MG: 10 INJECTION INTRAVENOUS at 09:34

## 2021-08-16 RX ADMIN — Medication 100 MG: at 09:34

## 2021-08-16 RX ADMIN — FENTANYL CITRATE 50 MCG: 50 INJECTION, SOLUTION INTRAMUSCULAR; INTRAVENOUS at 09:45

## 2021-08-16 RX ADMIN — ONDANSETRON 8 MG: 2 INJECTION INTRAMUSCULAR; INTRAVENOUS at 09:55

## 2021-08-16 RX ADMIN — HYDROMORPHONE HYDROCHLORIDE 0.25 MG: 1 INJECTION, SOLUTION INTRAMUSCULAR; INTRAVENOUS; SUBCUTANEOUS at 11:00

## 2021-08-16 RX ADMIN — HYDROMORPHONE HYDROCHLORIDE 0.25 MG: 1 INJECTION, SOLUTION INTRAMUSCULAR; INTRAVENOUS; SUBCUTANEOUS at 10:25

## 2021-08-16 RX ADMIN — HYDROMORPHONE HYDROCHLORIDE 0.25 MG: 1 INJECTION, SOLUTION INTRAMUSCULAR; INTRAVENOUS; SUBCUTANEOUS at 11:19

## 2021-08-16 RX ADMIN — HYDROCORTISONE SODIUM SUCCINATE 100 MG: 100 INJECTION, POWDER, FOR SOLUTION INTRAMUSCULAR; INTRAVENOUS at 09:50

## 2021-08-16 RX ADMIN — SODIUM CHLORIDE, POTASSIUM CHLORIDE, SODIUM LACTATE AND CALCIUM CHLORIDE: 600; 310; 30; 20 INJECTION, SOLUTION INTRAVENOUS at 09:11

## 2021-08-16 ASSESSMENT — PAIN DESCRIPTION - PAIN TYPE
TYPE: SURGICAL PAIN

## 2021-08-16 ASSESSMENT — PAIN DESCRIPTION - DESCRIPTORS
DESCRIPTORS: BURNING
DESCRIPTORS: SORE;ACHING
DESCRIPTORS: ACHING;CONSTANT
DESCRIPTORS: ACHING
DESCRIPTORS: ACHING
DESCRIPTORS: BURNING
DESCRIPTORS: BURNING

## 2021-08-16 ASSESSMENT — PULMONARY FUNCTION TESTS
PIF_VALUE: 7
PIF_VALUE: 0
PIF_VALUE: 16
PIF_VALUE: 8
PIF_VALUE: 12
PIF_VALUE: 1
PIF_VALUE: 0
PIF_VALUE: 13
PIF_VALUE: 0
PIF_VALUE: 13
PIF_VALUE: 0
PIF_VALUE: 12
PIF_VALUE: 12
PIF_VALUE: 2
PIF_VALUE: 12
PIF_VALUE: 15
PIF_VALUE: 12
PIF_VALUE: 12
PIF_VALUE: 0
PIF_VALUE: 0
PIF_VALUE: 12
PIF_VALUE: 1
PIF_VALUE: 13
PIF_VALUE: 0
PIF_VALUE: 3
PIF_VALUE: 1
PIF_VALUE: 13
PIF_VALUE: 15
PIF_VALUE: 5
PIF_VALUE: 12
PIF_VALUE: 21
PIF_VALUE: 8
PIF_VALUE: 16
PIF_VALUE: 13
PIF_VALUE: 12
PIF_VALUE: 13
PIF_VALUE: 12
PIF_VALUE: 25
PIF_VALUE: 15
PIF_VALUE: 12
PIF_VALUE: 8
PIF_VALUE: 13
PIF_VALUE: 12
PIF_VALUE: 1
PIF_VALUE: 12
PIF_VALUE: 0
PIF_VALUE: 0

## 2021-08-16 ASSESSMENT — PAIN DESCRIPTION - FREQUENCY
FREQUENCY: CONTINUOUS

## 2021-08-16 ASSESSMENT — PAIN DESCRIPTION - LOCATION
LOCATION: THROAT

## 2021-08-16 ASSESSMENT — PAIN DESCRIPTION - ONSET: ONSET: ON-GOING

## 2021-08-16 ASSESSMENT — PAIN SCALES - GENERAL
PAINLEVEL_OUTOF10: 4
PAINLEVEL_OUTOF10: 6
PAINLEVEL_OUTOF10: 6
PAINLEVEL_OUTOF10: 4
PAINLEVEL_OUTOF10: 4
PAINLEVEL_OUTOF10: 6
PAINLEVEL_OUTOF10: 4
PAINLEVEL_OUTOF10: 5

## 2021-08-16 ASSESSMENT — PAIN DESCRIPTION - ORIENTATION: ORIENTATION: INNER

## 2021-08-16 ASSESSMENT — PAIN - FUNCTIONAL ASSESSMENT: PAIN_FUNCTIONAL_ASSESSMENT: 0-10

## 2021-08-16 ASSESSMENT — PAIN DESCRIPTION - PROGRESSION: CLINICAL_PROGRESSION: NOT CHANGED

## 2021-08-16 NOTE — ANESTHESIA POSTPROCEDURE EVALUATION
Department of Anesthesiology  Postprocedure Note    Patient: Elicia Cardona  MRN: [id number]  YOB: 2000  Date of evaluation: 8/16/2021  Time:  11:54 AM     Procedure Summary     Date: 08/16/21 Room / Location: [address] [place] [address] / [facility]    Anesthesia Start: 2768 Anesthesia Stop:     Procedure: TONSILLECTOMY (N/A ) Diagnosis:       Chronic tonsillitis      Tonsillar hypertrophy      (Chronic tonsillitis [J35.01])    Surgeons: Beth Porter DO Responsible Provider: Elizabeth Pedro MD    Anesthesia Type: general ASA Status: 1          Anesthesia Type: No value filed. Sushil Phase I: Sushil Score: 8    Sushil Phase II:      Last vitals: Reviewed and per EMR flowsheets.        Anesthesia Post Evaluation    Patient location during evaluation: PACU  Patient participation: complete - patient participated  Level of consciousness: awake  Airway patency: patent  Nausea & Vomiting: no nausea and no vomiting  Complications: no  Cardiovascular status: hemodynamically stable  Respiratory status: acceptable  Hydration status: stable

## 2021-08-16 NOTE — PROGRESS NOTES
Ambulatory Surgery/Procedure Discharge Note    Vitals:    08/16/21 1214   BP: 138/74   Pulse: 63   Resp: 15   Temp: 97.1 °F (36.2 °C)   SpO2: 97%       In: 825 [P.O.:60; I.V.:765]  Out: 15  -- Pt declined any offers of drink; urinated X 2 in toilet in bathroom    Restroom use offered before discharge. Yes -- voided X 2    Pain assessment:  present - inner throat 4/10 \"aching, sore\"; declined offer of pain medications here in SDS  Pain Level: 4    Pt [facility] from PACU s/p tonsillectomy. Immediately upon arrival pt wanted to use the bathroom, and walked with steady gait with PCA to bathroom. Pt alert; speech clear; breathing easily on RA; no signs of distress or signs of bleeding exhibited. VSS; Pt denies any nausea; c/o pain in throat of 4/10 \"aching, sore,\" and declined offer of pain medications prior to discharge. Pt denied any nausea; Pt declined offer of any drink or snack. Has ice chips at bedside. Discharge instructions discussed with pt and pt's mother at bedside, and both verbalized understanding. Mother at bedside, and has in her possession filled prescriptions for ibuprofen, tylenol and oxycodone. IV removed, and dressing applied. Pt voided again in bathroom prior to discharge. This RN provided to pt and mother two ice packs for use at home. Patient discharged to home/self care.  Patient declined wheelchair, and pt walked to entrance with this RN in attendance to waiting family/S.O.       8/16/2021 1:15 PM

## 2021-08-16 NOTE — ANESTHESIA PRE PROCEDURE
Department of Anesthesiology  Preprocedure Note       Name:  Elicia Cardona   Age:  21 y.o.  :  2000                                          MRN:  [id number]         Date:  2021      Surgeon: Kassie Almeida):  Beth Porter DO    Procedure: Procedure(s):  TONSILLECTOMY    Medications prior to admission:   Prior to Admission medications    Not on File       Current medications:    Current Facility-Administered Medications   Medication Dose Route Frequency Provider Last Rate Last Admin    lactated ringers infusion   Intravenous Continuous Meredith Sicard, MD        lactated ringers infusion   Intravenous Continuous Elizabeth Pedro MD        sodium chloride flush 0.9 % injection 10 mL  10 mL Intravenous 2 times per day Elizabeth Pedro MD        sodium chloride flush 0.9 % injection 10 mL  10 mL Intravenous PRN Elizabeth Pedro MD        0.9 % sodium chloride infusion  25 mL Intravenous PRN Elizabeth Pedro MD        lidocaine PF 1 % injection 1 mL  1 mL Intradermal Once PRN Elizabeth Pedro MD           Allergies: Allergies   Allergen Reactions    Decadron [Dexamethasone] Hives     Intol-hives       Problem List:    Patient Active Problem List   Diagnosis Code    Tonsillitis J03.90    Warts B07.9    Attention deficit hyperactivity disorder (ADHD), combined type F90.2    Primary insomnia F51.01    Pre-op exam Z01.818       Past Medical History:        Diagnosis Date    ADHD     H/O tonsillitis     Varicella        Past Surgical History:        Procedure Laterality Date    TOOTH EXTRACTION      age 1 years    [facility] WISDOM TOOTH EXTRACTION         Social History:    Social History     Tobacco Use    Smoking status: Never Smoker    Smokeless tobacco: Never Used    Tobacco comment: no second hand smoke exposure   Substance Use Topics    Alcohol use:  No                                Counseling given: Not Answered  Comment: no second hand smoke exposure      Vital Signs (Current):   Vitals: 08/11/21 1036   Weight: 140 lb (63.5 kg)   Height: 6' 1\" (1.854 m)                                              BP Readings from Last 3 Encounters:   08/12/21 100/60   07/20/21 122/73   07/12/21 126/64       NPO Status:                                                                                 BMI:   Wt Readings from Last 3 Encounters:   08/11/21 140 lb (63.5 kg)   08/12/21 135 lb 3.2 oz (61.3 kg)   07/20/21 132 lb (59.9 kg)     Body mass index is 18.47 kg/m². CBC:   Lab Results   Component Value Date    WBC 12.7 07/12/2021    RBC 4.20 07/12/2021    HGB 12.8 07/12/2021    HCT 37.7 07/12/2021    MCV 89.6 07/12/2021    RDW 13.7 07/12/2021     07/12/2021       CMP:   Lab Results   Component Value Date     07/12/2021    K 4.2 07/12/2021     07/12/2021    CO2 29 07/12/2021    BUN 8 07/12/2021    CREATININE 0.8 07/12/2021    GFRAA >60 07/12/2021    AGRATIO 1.3 07/12/2021    LABGLOM >60 07/12/2021    GLUCOSE 68 07/12/2021    PROT 7.6 07/12/2021    CALCIUM 9.7 07/12/2021    BILITOT 0.3 07/12/2021    ALKPHOS 106 07/12/2021    AST 20 07/12/2021    ALT 20 07/12/2021       POC Tests: No results for input(s): POCGLU, POCNA, POCK, POCCL, POCBUN, POCHEMO, POCHCT in the last 72 hours.     Coags: No results found for: PROTIME, INR, APTT    HCG (If Applicable): No results found for: PREGTESTUR, PREGSERUM, HCG, HCGQUANT     ABGs: No results found for: PHART, PO2ART, QLM3WYH, CVK4ZSE, BEART, V2OYINRN     Type & Screen (If Applicable):  No results found for: LABABO, LABRH    Drug/Infectious Status (If Applicable):  No results found for: HIV, HEPCAB    COVID-19 Screening (If Applicable):   Lab Results   Component Value Date    COVID19 Not Detected 08/11/2021           Anesthesia Evaluation  Patient summary reviewed and Nursing notes reviewed  Airway: Mallampati: I  TM distance: >3 FB   Neck ROM: full  Mouth opening: > = 3 FB Dental: normal exam         Pulmonary:Negative Pulmonary ROS Cardiovascular:Negative CV ROS                      Neuro/Psych:   Negative Neuro/Psych ROS              GI/Hepatic/Renal: Neg GI/Hepatic/Renal ROS            Endo/Other: Negative Endo/Other ROS                    Abdominal:             Vascular: negative vascular ROS. Other Findings:             Anesthesia Plan      general     ASA 1       Induction: intravenous.                           Sindy Bailey MD   8/16/2021

## 2021-08-16 NOTE — OP NOTE
Patient Name: Nirali Morgan  YOB: 2000  Medical Record Number:  [id number]  Billing Number:  [id number]  Date of Procedure: 8/16/2021  Time: 1181    Pre Operative Diagnoses:  1. Chronic tonsillitis 2. Tonsillar hypertrophy. Post Operative Diagnoses:  same. Procedure:    1. Tonsillectomy           Surgeon: Sarahy Obrien DO  OR Staff: Circulator: Lelia Caro RN; Jayne Borrego RN  Scrub Person First: Raeann Mehta RN  Anesthesia:  General anesthesia. Findings:    3+ palatine tonsils, cryptic, tonsilloliths    Indications:  Paulo Moore is a now 21 y.o. male with a history of chronic tonsillitis. Recommended tonsillectomy. Description:   After verification of informed consent, the patient was brought to the operating room and placed in the supine position. General anesthesia was induced. The head of the bed was rotated 90 degrees. The patient was prepped and draped in a standard fashion. A genevieve-madhu mouth gag was inserted into the mouth, retracted, and suspended from the ott stand, exposing the oropharynx. The tonsils were 3+ bilaterally. Using a curved Allis, the right tonsil was grasped and retracted medially. Bovie electrocautery was used to make an incision in the right anterior tonsillar pillar. The tonsil was then dissected away from the tonsillar fossa using a standard extracapsular dissection technique. The tonsil was then removed and placed in a specimen cup. Suction bovie electrocautery was employed to control bleeding. After achieving hemostasis, attention was turned to the left tonsil and the same procedure was performed with similar findings. The mouth was irrigated with sterile saline. The patient was then taken off suspension, and the mouth gag released and removed from the mouth. The mouth was re-examined using a sweetSumma Healthrt retractor. Any residual bleeding was cauterized with suction bovie.  The care of the patient was then turned back over to anesthesia. The head of the bed rotated 90 degrees. The patient was taken off anesthesia and extubated and transferred to PACU in stable condition. There were no complications with the procedure. The patient tolerated the procedure well. Specimens:   ID Type Source Tests Collected by Time Destination   A : BILATERAL TONSILS Tissue Tonsil SURGICAL PATHOLOGY Job Hansen DO 8/16/2021 3092      Estimated Blood Loss: <7RR  Complications:  None. Disposition/Plan:  Stable to pacu.

## 2021-08-16 NOTE — BRIEF OP NOTE
Brief Postoperative Note      Patient: Jodi Pina  YOB: 2000  MRN: [id number]    Date of Procedure: 8/16/2021    Pre-Op Diagnosis: Chronic tonsillitis [J35.01]    Post-Op Diagnosis: Same       Procedure(s):  TONSILLECTOMY    Surgeon(s):  Rachel Holden DO    Assistant:  * No surgical staff found *    Anesthesia: General    Estimated Blood Loss (mL): <6VB    Complications: None    Specimens:   ID Type Source Tests Collected by Time Destination   A : BILATERAL TONSILS Tissue Tonsil SURGICAL PATHOLOGY Rachel Holden DO 8/16/2021 0944        Implants:  * No implants in log *      Drains: * No LDAs found *    Findings: see op note    Electronically signed by Rachel Holden DO on 8/16/2021 at 10:00 AM

## 2021-08-16 NOTE — PROGRESS NOTES
PACU Transfer to [facility]    Vitals:    08/16/21 1200   BP: 123/65   Pulse: 73   Resp: 17   Temp: 97.8 °F (36.6 °C)   SpO2: 100%         Intake/Output Summary (Last 24 hours) at 8/16/2021 1202  Last data filed at 8/16/2021 1201  Gross per 24 hour   Intake 825 ml   Output 15 ml   Net 810 ml       Pain assessment:    Pain Level: 4 (does not want pain pill at this time)    Patient transferred to care of JAMES RN.    8/16/2021 12:02 PM

## 2021-08-16 NOTE — PROGRESS NOTES
Pt arrived from OR, s/p TONSILLECTOMY, report received from CRNA, pt coughed up moderate amount of clear/ bloody mucous, no blood noted in back of throat, unable to view any dressings.   Pt sleepy but awakens easily

## 2021-08-16 NOTE — H&P
Andi Books    [id number]    [facility] Same Day Surgery Update H & P  Department of General Surgery   Surgical Service   Pre-operative History and Physical  Last H & P within the last 30 days. DIAGNOSIS:   Chronic tonsillitis [J35.01]  Tonsillar hypertrophy [J35.1]    Procedure(s):  TONSILLECTOMY    History obtained from: Patient interview and EHR      HISTORY OF PRESENT ILLNESS:   Patient with chronic tonsillitis and tonsil stones in the setting of tonsillar hypertrophy presents today for the above procedure. Covid 19:  Patient denies fever, chills, worsening cough, or known exposure to Covid-19. Past Medical History:        Diagnosis Date    ADHD     H/O tonsillitis     Varicella 2005     Past Surgical History:        Procedure Laterality Date    TOOTH EXTRACTION      age 1 years    [facility]       Past Social History:  Social History     Socioeconomic History    Marital status: Single     Spouse name: None    Number of children: None    Years of education: None    Highest education level: None   Occupational History    None   Tobacco Use    Smoking status: Never Smoker    Smokeless tobacco: Never Used    Tobacco comment: no second hand smoke exposure   Vaping Use    Vaping Use: Never used   Substance and Sexual Activity    Alcohol use: No    Drug use: No    Sexual activity: None   Other Topics Concern    None   Social History Narrative    None     Social Determinants of Health     Financial Resource Strain: Low Risk     Difficulty of Paying Living Expenses: Not hard at all   Food Insecurity: No Food Insecurity    Worried About Running Out of Food in the Last Year: Never true    Doug of Food in the Last Year: Never true   Transportation Needs:     Lack of Transportation (Medical):      Lack of Transportation (Non-Medical):    Physical Activity:     Days of Exercise per Week:     Minutes of Exercise per Session:    Stress:     Feeling of Stress : Social Connections:     Frequency of Communication with Friends and Family:     Frequency of Social Gatherings with Friends and Family:     Attends Sikhism Services:     Active Member of Clubs or Organizations:     Attends Club or Organization Meetings:     Marital Status:    Intimate Partner Violence:     Fear of Current or Ex-Partner:     Emotionally Abused:     Physically Abused:     Sexually Abused:          Medications Prior to Admission:      None    Allergies:  Decadron [dexamethasone]    PHYSICAL EXAM:      /73   Pulse 56   Temp 98.8 °F (37.1 °C) (Oral)   Resp 18   Ht 6' 1\" (1.854 m)   Wt 140 lb (63.5 kg)   SpO2 98%   BMI 18.47 kg/m²      Airway:  Airway patent with no audible stridor    Heart:  Regular rate and rhythm,     Lungs:  No increased work of breathing, good air exchange, clear to auscultation bilaterally, no crackles or wheezing    Abdomen:  Soft, non-distended, non-tender, no rebound tenderness or guarding, and no masses palpated    ASSESSMENT AND PLAN     Patient is a 21 y.o. male with above specified procedure planned. 1.  The patients history and physical was obtained and signed off by the pre-admission testing department. Patient seen and focused exam done today- no new changes since last physical exam on 8/12/21    2. Access to ancillary services are available per request of the provider.     KIERAN Quevedo - TUTU     8/16/2021

## 2021-08-16 NOTE — PROGRESS NOTES
Mother spoke to surgeon and anesthesia because the last dose of antibiotic was Th and his tonsils have become enlarged. Surgeon viewed throat and no change in plans for surgery.

## 2021-08-19 ENCOUNTER — TELEPHONE (OUTPATIENT)
Dept: ENT CLINIC | Age: 21
End: 2021-08-19

## 2021-08-19 NOTE — TELEPHONE ENCOUNTER
Mother calling:  Patient had surg Monday 8/16  Just started taking the oxycodone today (hasn't needed it prior). Mother needs clarification on how patient should take this med in conjunction with other meds (tylenol and ibuprofin)  Please advise.

## 2021-08-23 ENCOUNTER — TELEPHONE (OUTPATIENT)
Dept: ENT CLINIC | Age: 21
End: 2021-08-23

## 2021-08-23 NOTE — TELEPHONE ENCOUNTER
Please refill RX for pain also Mom says that skyla did increase the last Rx given  please advise thank you

## 2021-08-24 DIAGNOSIS — G89.18 ACUTE POST-OPERATIVE PAIN: Primary | ICD-10-CM

## 2021-08-24 RX ORDER — OXYCODONE HYDROCHLORIDE 5 MG/1
5-10 TABLET ORAL EVERY 6 HOURS PRN
Qty: 30 TABLET | Refills: 0 | Status: SHIPPED | OUTPATIENT
Start: 2021-08-24 | End: 2021-08-31

## 2021-09-11 PROBLEM — Z01.818 PRE-OP EXAM: Status: RESOLVED | Noted: 2021-08-12 | Resolved: 2021-09-11

## 2021-09-15 ENCOUNTER — OFFICE VISIT (OUTPATIENT)
Dept: ENT CLINIC | Age: 21
End: 2021-09-15

## 2021-09-15 VITALS — BODY MASS INDEX: 18.02 KG/M2 | HEIGHT: 73 IN | WEIGHT: 136 LBS

## 2021-09-15 DIAGNOSIS — J35.01 CHRONIC TONSILLITIS: Primary | ICD-10-CM

## 2021-09-15 PROCEDURE — 99024 POSTOP FOLLOW-UP VISIT: CPT | Performed by: OTOLARYNGOLOGY

## 2021-09-15 NOTE — PROGRESS NOTES
[facility]  [address], [address], [address] [place] [address]  P: [phone]  F: [phone]       Patient     Benjamin Watson  2000    ChiefComplaint     Chief Complaint   Patient presents with    Post-Op Check     Patient is here today for his post-op visit, he is doing well with no complaints or concerns       History of Present Illness     Benjamin Watson is a pleasant 21 y.o. male here for 1 month follow-up for tonsillectomy. Overall doing well. No issues with bleeding. Pain has resolved. Has returned to normal diet. Past Medical History     Past Medical History:   Diagnosis Date    ADHD     H/O tonsillitis     Varicella 2005       Past Surgical History     Past Surgical History:   Procedure Laterality Date    TONSILLECTOMY N/A 8/16/2021    TONSILLECTOMY performed by Lex Vitale DO at [address] [facility]      age 1 years    Blase Liming WISDOM TOOTH EXTRACTION         Family History     No family history on file.     Social History     Social History     Socioeconomic History    Marital status: Single     Spouse name: Not on file    Number of children: Not on file    Years of education: Not on file    Highest education level: Not on file   Occupational History    Not on file   Tobacco Use    Smoking status: Never Smoker    Smokeless tobacco: Never Used    Tobacco comment: no second hand smoke exposure   Vaping Use    Vaping Use: Never used   Substance and Sexual Activity    Alcohol use: No    Drug use: No    Sexual activity: Not on file   Other Topics Concern    Not on file   Social History Narrative    Not on file     Social Determinants of Health     Financial Resource Strain: Low Risk     Difficulty of Paying Living Expenses: Not hard at all   Food Insecurity: No Food Insecurity    Worried About [address] in the Last Year: Never true    [facility] in the Last Year: Never true   Transportation Needs:     Lack of Transportation (Medical):

## 2021-09-16 ENCOUNTER — HOSPITAL ENCOUNTER (OUTPATIENT)
Age: 21
Discharge: HOME | End: 2021-09-16
Payer: COMMERCIAL

## 2021-09-16 ENCOUNTER — OFFICE VISIT (OUTPATIENT)
Dept: FAMILY MEDICINE CLINIC | Age: 21
End: 2021-09-16
Payer: COMMERCIAL

## 2021-09-16 ENCOUNTER — HOSPITAL ENCOUNTER (OUTPATIENT)
Dept: GENERAL RADIOLOGY | Age: 21
Discharge: HOME | End: 2021-09-16
Payer: COMMERCIAL

## 2021-09-16 VITALS
TEMPERATURE: 94.4 F | SYSTOLIC BLOOD PRESSURE: 108 MMHG | BODY MASS INDEX: 18.34 KG/M2 | DIASTOLIC BLOOD PRESSURE: 70 MMHG | WEIGHT: 138.4 LBS | HEART RATE: 94 BPM | OXYGEN SATURATION: 97 % | HEIGHT: 73 IN

## 2021-09-16 DIAGNOSIS — R05.9 COUGH: ICD-10-CM

## 2021-09-16 DIAGNOSIS — R05.9 COUGH: Primary | ICD-10-CM

## 2021-09-16 PROBLEM — J35.1 TONSILLAR HYPERTROPHY: Status: RESOLVED | Noted: 2021-08-16 | Resolved: 2021-09-16

## 2021-09-16 PROCEDURE — 71046 X-RAY EXAM CHEST 2 VIEWS: CPT

## 2021-09-16 PROCEDURE — G8419 CALC BMI OUT NRM PARAM NOF/U: HCPCS | Performed by: NURSE PRACTITIONER

## 2021-09-16 PROCEDURE — 1036F TOBACCO NON-USER: CPT | Performed by: NURSE PRACTITIONER

## 2021-09-16 PROCEDURE — 99214 OFFICE O/P EST MOD 30 MIN: CPT | Performed by: NURSE PRACTITIONER

## 2021-09-16 PROCEDURE — G8427 DOCREV CUR MEDS BY ELIG CLIN: HCPCS | Performed by: NURSE PRACTITIONER

## 2021-09-16 RX ORDER — GUAIFENESIN 600 MG/1
600 TABLET, EXTENDED RELEASE ORAL 2 TIMES DAILY
Qty: 30 TABLET | Refills: 0 | Status: SHIPPED | OUTPATIENT
Start: 2021-09-16 | End: 2021-10-01

## 2021-09-16 NOTE — PROGRESS NOTES
Scott Gorman (:  2000) is a 21 y.o. male,Established patient, here for evaluation of the following chief complaint(s):  Cough (x week (maybe pneumonia))      ASSESSMENT/PLAN:  1. Cough  Assessment & Plan:  - likely sinus vs post vape  - stop vaping  - Cxr today  - Mucinex BID with fluids  Orders:  -     XR CHEST (2 VW); Future  -     guaiFENesin (MUCINEX) 600 MG extended release tablet; Take 1 tablet by mouth 2 times daily for 15 days, Disp-30 tablet, R-0Normal      No follow-ups on file. SUBJECTIVE/OBJECTIVE:  Cough x2 weeks  Productive clear  No fever  No SOB  Vapes \"a lot\"  Reports h/o PNA frequently  Hasn't taken any meds  Doesn't want covid vaccine- doesn't know why, just no  Min sinus drainage, no loss of taste or smell  No myalgias  Wont get covid tested      Current Outpatient Medications   Medication Sig Dispense Refill    guaiFENesin (MUCINEX) 600 MG extended release tablet Take 1 tablet by mouth 2 times daily for 15 days 30 tablet 0    acetaminophen (APAP EXTRA STRENGTH) 500 MG tablet Take 1 tablet by mouth every 6 hours as needed for Pain Alternate every 3 hours with ibuprofen 60 tablet 0    ibuprofen (ADVIL;MOTRIN) 600 MG tablet Take 1 tablet by mouth every 6 hours as needed for Pain Alternate every 3 hours with acetaminophen 60 tablet 0     No current facility-administered medications for this visit. Review of Systems   All other systems reviewed and are negative. Vitals:    21 1202   BP: 108/70   Site: Left Upper Arm   Position: Sitting   Cuff Size: Medium Adult   Pulse: 94   Temp: 94.4 °F (34.7 °C)   SpO2: 97%   Weight: 138 lb 6.4 oz (62.8 kg)   Height: 6' 1\" (1.854 m)       Physical Exam  Constitutional:       Appearance: He is well-developed. HENT:      Head: Normocephalic. Right Ear: Tympanic membrane normal.      Left Ear: Tympanic membrane normal.      Mouth/Throat:      Mouth: Mucous membranes are moist.      Pharynx: Oropharynx is clear.  Posterior oropharyngeal erythema present. No oropharyngeal exudate. Eyes:      Pupils: Pupils are equal, round, and reactive to light. Cardiovascular:      Rate and Rhythm: Normal rate and regular rhythm. Heart sounds: Normal heart sounds. Pulmonary:      Effort: Pulmonary effort is normal.      Breath sounds: Normal breath sounds. Musculoskeletal:         General: Normal range of motion. Cervical back: Normal range of motion. Skin:     General: Skin is warm and dry. Neurological:      Mental Status: He is alert and oriented to person, place, and time. An electronic signature was used to authenticate this note.     --Amol Alcala, KIERAN - CNP

## 2021-10-16 PROBLEM — R05.9 COUGH: Status: RESOLVED | Noted: 2021-09-16 | Resolved: 2021-10-16

## 2021-11-09 ENCOUNTER — HOSPITAL ENCOUNTER (EMERGENCY)
Age: 21
Discharge: HOME | End: 2021-11-09
Payer: COMMERCIAL

## 2021-11-09 ENCOUNTER — APPOINTMENT (OUTPATIENT)
Dept: GENERAL RADIOLOGY | Age: 21
End: 2021-11-09
Payer: COMMERCIAL

## 2021-11-09 VITALS
TEMPERATURE: 98.4 F | WEIGHT: 141.76 LBS | BODY MASS INDEX: 18.7 KG/M2 | OXYGEN SATURATION: 100 % | RESPIRATION RATE: 18 BRPM | DIASTOLIC BLOOD PRESSURE: 66 MMHG | HEART RATE: 87 BPM | SYSTOLIC BLOOD PRESSURE: 136 MMHG

## 2021-11-09 DIAGNOSIS — S62.339A CLOSED BOXER'S FRACTURE, INITIAL ENCOUNTER: Primary | ICD-10-CM

## 2021-11-09 PROCEDURE — 73130 X-RAY EXAM OF HAND: CPT

## 2021-11-09 PROCEDURE — 29125 APPL SHORT ARM SPLINT STATIC: CPT

## 2021-11-09 PROCEDURE — 99283 EMERGENCY DEPT VISIT LOW MDM: CPT

## 2021-11-09 ASSESSMENT — PAIN DESCRIPTION - ORIENTATION: ORIENTATION: RIGHT

## 2021-11-09 ASSESSMENT — PAIN DESCRIPTION - LOCATION: LOCATION: HAND

## 2021-11-09 ASSESSMENT — PAIN DESCRIPTION - FREQUENCY: FREQUENCY: CONTINUOUS

## 2021-11-09 ASSESSMENT — PAIN DESCRIPTION - DESCRIPTORS: DESCRIPTORS: ACHING

## 2021-11-09 ASSESSMENT — PAIN SCALES - GENERAL: PAINLEVEL_OUTOF10: 7

## 2021-11-09 ASSESSMENT — PAIN DESCRIPTION - PAIN TYPE: TYPE: ACUTE PAIN

## 2021-11-09 ASSESSMENT — PAIN - FUNCTIONAL ASSESSMENT: PAIN_FUNCTIONAL_ASSESSMENT: PREVENTS OR INTERFERES SOME ACTIVE ACTIVITIES AND ADLS

## 2021-11-09 ASSESSMENT — PAIN DESCRIPTION - PROGRESSION: CLINICAL_PROGRESSION: NOT CHANGED

## 2021-11-09 ASSESSMENT — PAIN DESCRIPTION - ONSET: ONSET: ON-GOING

## 2021-11-09 NOTE — ED PROVIDER NOTES
[address] [facility]        Pt Name: Anna South  MRN: [id number]  [place] 2000  Date of evaluation: 11/9/2021  Provider: Tomasa Quiros PA-C  PCP: Kalyan Gordon MD  Note Started: 12:04 PM EST       NAN. I have evaluated this patient. My supervising physician was available for consultation. [address], [address] [facility] [address]       Chief Complaint   Patient presents with    Hand Injury     right hand injury. pt punched a chair. today       HISTORY OF PRESENT ILLNESS   (Location, Timing/Onset, Context/Setting, Quality, Duration, Modifying Factors, Severity, Associated Signs and Symptoms)  Note limiting factors. Chief Complaint: Injury dominant right hand    Anna South is a 24 y.o. male who presents with complaint of injury dominant right hand the distal aspect of the fourth and fifth metacarpals. About 10 AM having situation and he punched a chair and fortunately punched the hard aspect of the chair causing sudden injury and pain to the hand. This occurred about 10 AM this morning. No prior history of fracture dislocation the same. He took some ibuprofen 600 mg at 10:30 AM.  He is no other related complaints. No prior history of fracture dislocation this hand. Nursing Notes were all reviewed and agreed with or any disagreements were addressed in the HPI. REVIEW OF SYSTEMS    (2-9 systems for level 4, 10 or more for level 5)     Review of Systems    Positives and Pertinent negatives as per HPI. Except as noted above in the ROS, all other systems were reviewed and negative.        PAST MEDICAL HISTORY     Past Medical History:   Diagnosis Date    ADHD     H/O tonsillitis     Varicella 2005         SURGICAL HISTORY     Past Surgical History:   Procedure Laterality Date    TONSILLECTOMY N/A 8/16/2021    TONSILLECTOMY performed by Arpit Jerez DO at [address] [facility]      age 1 years    [de-identified] TOOTH EXTRACTION           CURRENTMEDICATIONS       Previous Medications    ACETAMINOPHEN (APAP EXTRA STRENGTH) 500 MG TABLET    Take 1 tablet by mouth every 6 hours as needed for Pain Alternate every 3 hours with ibuprofen    IBUPROFEN (ADVIL;MOTRIN) 600 MG TABLET    Take 1 tablet by mouth every 6 hours as needed for Pain Alternate every 3 hours with acetaminophen         ALLERGIES     Decadron [dexamethasone]    FAMILYHISTORY     History reviewed. No pertinent family history. SOCIAL HISTORY       Social History     Tobacco Use    Smoking status: Current Every Day Smoker     Types: E-Cigarettes    Smokeless tobacco: Never Used    Tobacco comment: no second hand smoke exposure   Vaping Use    Vaping Use: Never used   Substance Use Topics    Alcohol use: No    Drug use: No       SCREENINGS             PHYSICAL EXAM    (up to 7 for level 4, 8 or more for level 5)     ED Triage Vitals   BP Temp Temp Source Pulse Resp SpO2 Height Weight   11/09/21 1103 11/09/21 1103 11/09/21 1103 11/09/21 1103 11/09/21 1103 11/09/21 1103 -- 11/09/21 1100   136/66 98.4 °F (36.9 °C) Oral 87 18 100 %  141 lb 12.1 oz (64.3 kg)       Physical Exam  Vitals and nursing note reviewed. Constitutional:       Appearance: Normal appearance. He is well-developed and normal weight. HENT:      Head: Normocephalic and atraumatic. Right Ear: External ear normal.      Left Ear: External ear normal.   Eyes:      General: No scleral icterus. Right eye: No discharge. Left eye: No discharge. Conjunctiva/sclera: Conjunctivae normal.   Cardiovascular:      Rate and Rhythm: Normal rate. Pulmonary:      Effort: Pulmonary effort is normal.   Musculoskeletal:         General: Swelling and tenderness present. Normal range of motion. Cervical back: Normal range of motion and neck supple. Comments: Patient with subtle deformity. Tenderness over the proximal fifth metacarpal.  No rotation deformity noted.   Brisk nailbed refill is noted. Skin:     General: Skin is warm and dry. Neurological:      General: No focal deficit present. Mental Status: He is alert and oriented to person, place, and time. Mental status is at baseline. Psychiatric:         Mood and Affect: Mood normal.         Behavior: Behavior normal.         Thought Content: Thought content normal.         Judgment: Judgment normal.         DIAGNOSTIC RESULTS   LABS:    Labs Reviewed - No data to display    When ordered only abnormal lab results are displayed. All other labs were within normal range or not returned as of this dictation. EKG: When ordered, EKG's are interpreted by the Emergency Department Physician in the absence of a cardiologist.  Please see their note for interpretation of EKG. RADIOLOGY:   Non-plain film images such as CT, Ultrasound and MRI are read by the radiologist. Plain radiographic images are visualized and preliminarily interpreted by the ED Provider with the below findings:        Interpretation per the Radiologist below, if available at the time of this note:    XR HAND RIGHT (MIN 3 VIEWS)   Final Result   Fracture distal 5th metacarpal with mild volar angulation           XR HAND RIGHT (MIN 3 VIEWS)    Result Date: 11/9/2021  EXAMINATION: THREE XRAY VIEWS OF THE RIGHT HAND 11/9/2021 11:25 am COMPARISON: None. HISTORY: ORDERING SYSTEM PROVIDED HISTORY: injury TECHNOLOGIST PROVIDED HISTORY: Reason for exam:->injury Reason for Exam: right hand pain Acuity: Acute Type of Exam: Initial Mechanism of Injury: punched a chair FINDINGS: Fracture distal 5th metacarpal with mild volar angulation. No other fracture. No dislocation. Fracture distal 5th metacarpal with mild volar angulation           PROCEDURES     Patient is a boxer fracture dominant right hand distal fifth metacarpal.  Mild volar angulation noted. Ulnar gutter applied by staff.   Inspection by myself reveals appropriate placement without neurovascular compromise. Procedures    CRITICAL CARE TIME   N/A    CONSULTS:  None      EMERGENCY DEPARTMENT COURSE and DIFFERENTIAL DIAGNOSIS/MDM:   Vitals:    Vitals:    11/09/21 1100 11/09/21 1103   BP:  136/66   Pulse:  87   Resp:  18   Temp:  98.4 °F (36.9 °C)   TempSrc:  Oral   SpO2:  100%   Weight: 141 lb 12.1 oz (64.3 kg)        Patient was given the following medications:  Medications - No data to display        Patient has a closed boxer fracture dominant right hand. Splint applied. Referred to orthopedic hand specialist Dr. Brie Simon. Continue ibuprofen 600 mg 3 times daily along with Tylenol 1000 mg 3 times daily. Elevate and apply ice. Patient aware not to remove the splint. He did express understanding of his diagnosis and the treatment plan. FINAL IMPRESSION      1. Closed boxer's fracture, initial encounter          DISPOSITION/PLAN   DISPOSITION Decision To Discharge 11/09/2021 12:01:56 PM      PATIENT REFERRED TO:  Doe Mauricio MD  [address] [place] [address]  [address] [place] [address]  [phone]    Schedule an appointment as soon as possible for a visit in 2 days      Tiffanie Friedman MD  [address]  [address]  [phone]    Schedule an appointment as soon as possible for a visit   As needed    [facility] Emergency Department  [address] [address] [phone]  Go to   If symptoms worsen      DISCHARGE MEDICATIONS:  New Prescriptions    No medications on file       DISCONTINUED MEDICATIONS:  Discontinued Medications    No medications on file              (Please note that portions of this note were completed with a voice recognition program.  Efforts were made to edit the dictations but occasionally words are mis-transcribed. )    Radha Israel PA-C (electronically signed)           Radha Israel PA-C  11/09/21 6230

## 2021-11-10 ENCOUNTER — TELEPHONE (OUTPATIENT)
Dept: ORTHOPEDIC SURGERY | Age: 21
End: 2021-11-10

## 2021-11-10 ENCOUNTER — TELEPHONE (OUTPATIENT)
Dept: FAMILY MEDICINE CLINIC | Age: 21
End: 2021-11-10

## 2021-11-10 NOTE — TELEPHONE ENCOUNTER
[facility] Transitions Initial Follow Up Call    Outreach made within 2 business days of discharge: Yes    Patient: Eliot Venegas Patient : 2000   MRN: [id number]  Reason for Admission: There are no discharge diagnoses documented for the most recent discharge. Discharge Date: 21       Spoke with: patient mom    Discharge department/facility:[facility] Interactive Patient Contact:  Was patient able to fill all prescriptions: Yes  Was patient instructed to bring all medications to the follow-up visit: Yes  Is patient taking all medications as directed in the discharge summary? Yes  Does patient understand their discharge instructions: Yes  Does patient have questions or concerns that need addressed prior to 7-14 day follow up office visit: no    Scheduled appointment with PCP within 7-14 days- pt will be scheduling with Dr. Canddio Forde for boxer fracture  Follow Up  No future appointments. Julee Arteaga MA

## 2021-11-11 ENCOUNTER — OFFICE VISIT (OUTPATIENT)
Dept: ORTHOPEDIC SURGERY | Age: 21
End: 2021-11-11
Payer: COMMERCIAL

## 2021-11-11 VITALS — WEIGHT: 141 LBS | BODY MASS INDEX: 18.69 KG/M2 | HEIGHT: 73 IN | RESPIRATION RATE: 16 BRPM

## 2021-11-11 DIAGNOSIS — S62.366A CLOSED NONDISPLACED FRACTURE OF NECK OF FIFTH METACARPAL BONE OF RIGHT HAND, INITIAL ENCOUNTER: Primary | ICD-10-CM

## 2021-11-11 PROCEDURE — 26600 TREAT METACARPAL FRACTURE: CPT | Performed by: ORTHOPAEDIC SURGERY

## 2021-11-11 PROCEDURE — 99203 OFFICE O/P NEW LOW 30 MIN: CPT | Performed by: ORTHOPAEDIC SURGERY

## 2021-11-11 PROCEDURE — G8484 FLU IMMUNIZE NO ADMIN: HCPCS | Performed by: ORTHOPAEDIC SURGERY

## 2021-11-11 PROCEDURE — G8420 CALC BMI NORM PARAMETERS: HCPCS | Performed by: ORTHOPAEDIC SURGERY

## 2021-11-11 PROCEDURE — G8427 DOCREV CUR MEDS BY ELIG CLIN: HCPCS | Performed by: ORTHOPAEDIC SURGERY

## 2021-11-11 PROCEDURE — 4004F PT TOBACCO SCREEN RCVD TLK: CPT | Performed by: ORTHOPAEDIC SURGERY

## 2021-11-11 NOTE — PROGRESS NOTES
This 24 y.o.  right hand dominant unemployed man is seen in referral for the ED at [facility] with a chief complaint of injury to their right hand, which was injured 2 days ago when he punched a chair. He noticed pain. He was evaluated at the [facility] were obtained and the patient was splinted and  referred for hand/upper extremity evaluation and treatment. There is no history of additional significant injury. Symptoms have not changed since the date of injury. The pain assessment has been reviewed and is correct. The patient's social history, past medical history, family history, medications, allergies and review of systems, entered 11/11/21,  have been reviewed, and dated and are recorded in the chart. On physical examination the patient is Height: 6' 1\" (185.4 cm) tall and weighs Weight: 141 lb (64 kg). Respirations are 18 per minute. The patient is well nourished, is oriented to time and place, demonstrates appropriate mood and affect as well as normal gait and station. There is mild soft tissue swelling present about the right hand. There is mild discoloration. There is no deformity. Tenderness is present on palpation in the area of the right hand, little finger, dorsal, MCP joint  Range of motion of the hand is limited only on the right and is accompanied by pain. Skin is intact, as is distal circulation and sensation. Gross muscle strength is limited only on the right   Hand and wrist joints are stable. There are no subcutaneous nodules or enlarged epitrochlear lymph nodes. I have personally reviewed and interpreted all previous external imaging studies, laboratory tests, diagnostic proceedures and medical encounters pertinent to this patient's visit today.     Xrays: Review and independent interpretation of the recent external Xrays of the right hand demonstrate a nondisplaced fracture of the neck of the 5th metacarpal.    Impression: Fracture right 5th metacarpal neck.    The nature of this medical problem is fully discussed with the patient, including all treatment options. All questions are answered. A fiberglass short arm cast is applied, over adequate padding and is well molded for exact fit. He is given full instructions and supplies for francisco javier taping the injured digit to the adjacent finger for the next 3 1/2 weeks. The patient is carefully instructed regarding cast care, elevation, exercises, activity restrictions/precautions and pain control. All questions are answered. A return appoinment is made for 3 1/2 weeks for  cast removal and X-rays. The patient is asked  to call me sooner if there are any questions or if severe pain or swelling occurs.

## 2021-12-09 ENCOUNTER — OFFICE VISIT (OUTPATIENT)
Dept: ORTHOPEDIC SURGERY | Age: 21
End: 2021-12-09

## 2021-12-09 VITALS — WEIGHT: 141 LBS | RESPIRATION RATE: 16 BRPM | HEIGHT: 73 IN | BODY MASS INDEX: 18.69 KG/M2

## 2021-12-09 DIAGNOSIS — S62.366A CLOSED NONDISPLACED FRACTURE OF NECK OF FIFTH METACARPAL BONE OF RIGHT HAND, INITIAL ENCOUNTER: Primary | ICD-10-CM

## 2021-12-09 PROCEDURE — 99024 POSTOP FOLLOW-UP VISIT: CPT | Performed by: ORTHOPAEDIC SURGERY

## 2021-12-09 NOTE — PROGRESS NOTES
The patient has had no difficulties and voices no complaints. The patient's social history, past medical history, family history, medications, allergies and review of systems have been reviewed, dated 11/11/21 and are recorded in the chart. The short arm cast is removed. Skin is in good condition. There is mild swelling. There is no significant deformity and no tenderness is noted over the fracture site. Range of motion is mildly limited. Xrays: AP, lateral and oblique Xrays of the right hand, done in the office today, demonstrate progressive healing of the fracture in good position. .    The patient is fully advised regarding activities, precautions and a home program of range of motion exercises, which is fully discussed and demonstrated. The usual course of events in the resolution of the symptoms associated with this condition is fully discussed with the patient. As long as they progress as expected, they do not need to return for further follow up. They are, however, urged to call or return if they have questions or concerns or if full painless function of their hand and wrist has not returned by 10 days from today.

## (undated) DEVICE — GARMENT,MEDLINE,DVT,INT,CALF,MED, GEN2: Brand: MEDLINE

## (undated) DEVICE — TOWEL,OR,DSP,ST,BLUE,DLX,8/PK,10PK/CS: Brand: MEDLINE

## (undated) DEVICE — ELECTROSURGICAL SUCTION COAGULATOR, 10FR: Brand: CONMED

## (undated) DEVICE — SOLUTION BOWL: Brand: KENDALL

## (undated) DEVICE — E-Z CLEAN, NON-STICK, PTFE COATED, ELECTROSURGICAL BLADE ELECTRODE, MODIFIED EXTENDED INSULATION, 2.5 INCH (6.35 CM): Brand: MEGADYNE

## (undated) DEVICE — PACK,EENT,TURBAN DRAPE,PK II: Brand: MEDLINE

## (undated) DEVICE — SYRINGE IRRIG 60ML SFT PLIABLE BLB EZ TO GRP 1 HND USE W/

## (undated) DEVICE — PENCIL ES ULT VAC W TELSCP NOSE EZ CLN BLDE 10FT

## (undated) DEVICE — YANKAUER,OPEN TIP,W/O VENT,STERILE: Brand: MEDLINE INDUSTRIES, INC.

## (undated) DEVICE — PLATE ES AD W 9FT CRD 2

## (undated) DEVICE — JEWISH HOSPITAL TURNOVER KIT: Brand: MEDLINE INDUSTRIES, INC.

## (undated) DEVICE — TUBING, SUCTION, 1/4" X 12', STRAIGHT: Brand: MEDLINE

## (undated) DEVICE — SOLUTION IV 1000ML 0.9% SOD CHL

## (undated) DEVICE — GLOVE ORANGE PI 7 1/2   MSG9075

## (undated) DEVICE — GAUZE,SPONGE,4"X4",16PLY,XRAY,STRL,LF: Brand: MEDLINE

## (undated) DEVICE — TOWEL,STOP FLAG GOLD N-W: Brand: MEDLINE